# Patient Record
Sex: FEMALE | Race: WHITE | NOT HISPANIC OR LATINO | Employment: OTHER | URBAN - METROPOLITAN AREA
[De-identification: names, ages, dates, MRNs, and addresses within clinical notes are randomized per-mention and may not be internally consistent; named-entity substitution may affect disease eponyms.]

---

## 2023-06-08 ENCOUNTER — PREP FOR SURGERY (OUTPATIENT)
Dept: INTERNAL MEDICINE | Facility: HOSPITAL | Age: 63
End: 2023-06-08
Payer: MEDICAID

## 2023-06-08 ENCOUNTER — HOSPITAL ENCOUNTER (INPATIENT)
Facility: HOSPITAL | Age: 63
LOS: 3 days | Discharge: HOME | End: 2023-06-11
Attending: INTERNAL MEDICINE | Admitting: FAMILY MEDICINE
Payer: MEDICAID

## 2023-06-08 DIAGNOSIS — R26.2 DIFFICULTY IN WALKING: Primary | ICD-10-CM

## 2023-06-08 DIAGNOSIS — I50.22 CHRONIC SYSTOLIC HEART FAILURE: ICD-10-CM

## 2023-06-08 DIAGNOSIS — Z78.9 DECREASED ACTIVITIES OF DAILY LIVING (ADL): ICD-10-CM

## 2023-06-08 PROBLEM — I50.9 ACUTE CHF: Status: ACTIVE | Noted: 2023-06-08

## 2023-06-08 PROBLEM — J18.9 CAP (COMMUNITY ACQUIRED PNEUMONIA): Status: ACTIVE | Noted: 2023-06-08

## 2023-06-08 LAB
ANION GAP SERPL CALCULATED.3IONS-SCNC: 15.9 MMOL/L (ref 5–15)
BACTERIA UR QL AUTO: ABNORMAL /HPF
BILIRUB UR QL STRIP: NEGATIVE
BUN SERPL-MCNC: 16 MG/DL (ref 8–23)
BUN/CREAT SERPL: 16.7 (ref 7–25)
CALCIUM SPEC-SCNC: 9 MG/DL (ref 8.6–10.5)
CHLORIDE SERPL-SCNC: 101 MMOL/L (ref 98–107)
CLARITY UR: CLEAR
CO2 SERPL-SCNC: 23.1 MMOL/L (ref 22–29)
COLOR UR: YELLOW
CREAT SERPL-MCNC: 0.96 MG/DL (ref 0.57–1)
D-LACTATE SERPL-SCNC: 0.9 MMOL/L (ref 0.5–2)
EGFRCR SERPLBLD CKD-EPI 2021: 67.5 ML/MIN/1.73
GEN 5 2HR TROPONIN T REFLEX: 12 NG/L
GLUCOSE BLDC GLUCOMTR-MCNC: 191 MG/DL (ref 70–99)
GLUCOSE BLDC GLUCOMTR-MCNC: 197 MG/DL (ref 70–99)
GLUCOSE SERPL-MCNC: 192 MG/DL (ref 65–99)
GLUCOSE UR STRIP-MCNC: NEGATIVE MG/DL
HBA1C MFR BLD: 6.5 % (ref 4.8–5.6)
HGB UR QL STRIP.AUTO: ABNORMAL
HYALINE CASTS UR QL AUTO: ABNORMAL /LPF
KETONES UR QL STRIP: NEGATIVE
L PNEUMO1 AG UR QL IA: NEGATIVE
LEUKOCYTE ESTERASE UR QL STRIP.AUTO: NEGATIVE
MAGNESIUM SERPL-MCNC: 1.8 MG/DL (ref 1.6–2.4)
MRSA DNA SPEC QL NAA+PROBE: NORMAL
NITRITE UR QL STRIP: NEGATIVE
PH UR STRIP.AUTO: 5.5 [PH] (ref 5–8)
PHOSPHATE SERPL-MCNC: 3.4 MG/DL (ref 2.5–4.5)
POTASSIUM SERPL-SCNC: 4.2 MMOL/L (ref 3.5–5.2)
PROCALCITONIN SERPL-MCNC: 0.24 NG/ML (ref 0–0.25)
PROT UR QL STRIP: ABNORMAL
RBC # UR STRIP: ABNORMAL /HPF
REF LAB TEST METHOD: ABNORMAL
S PNEUM AG SPEC QL LA: NEGATIVE
SODIUM SERPL-SCNC: 140 MMOL/L (ref 136–145)
SP GR UR STRIP: 1.01 (ref 1–1.03)
SQUAMOUS #/AREA URNS HPF: ABNORMAL /HPF
TROPONIN T DELTA: -1 NG/L
TROPONIN T SERPL HS-MCNC: 13 NG/L
TSH SERPL DL<=0.05 MIU/L-ACNC: 0.91 UIU/ML (ref 0.27–4.2)
UROBILINOGEN UR QL STRIP: ABNORMAL
WBC # UR STRIP: ABNORMAL /HPF

## 2023-06-08 PROCEDURE — 63710000001 INSULIN LISPRO (HUMAN) PER 5 UNITS: Performed by: INTERNAL MEDICINE

## 2023-06-08 PROCEDURE — 93010 ELECTROCARDIOGRAM REPORT: CPT | Performed by: INTERNAL MEDICINE

## 2023-06-08 PROCEDURE — 87641 MR-STAPH DNA AMP PROBE: CPT | Performed by: INTERNAL MEDICINE

## 2023-06-08 PROCEDURE — 84100 ASSAY OF PHOSPHORUS: CPT | Performed by: INTERNAL MEDICINE

## 2023-06-08 PROCEDURE — 82948 REAGENT STRIP/BLOOD GLUCOSE: CPT

## 2023-06-08 PROCEDURE — 84484 ASSAY OF TROPONIN QUANT: CPT | Performed by: INTERNAL MEDICINE

## 2023-06-08 PROCEDURE — 99222 1ST HOSP IP/OBS MODERATE 55: CPT | Performed by: INTERNAL MEDICINE

## 2023-06-08 PROCEDURE — 87070 CULTURE OTHR SPECIMN AEROBIC: CPT | Performed by: INTERNAL MEDICINE

## 2023-06-08 PROCEDURE — 87205 SMEAR GRAM STAIN: CPT | Performed by: INTERNAL MEDICINE

## 2023-06-08 PROCEDURE — 25010000002 FUROSEMIDE PER 20 MG: Performed by: INTERNAL MEDICINE

## 2023-06-08 PROCEDURE — 93005 ELECTROCARDIOGRAM TRACING: CPT | Performed by: INTERNAL MEDICINE

## 2023-06-08 PROCEDURE — 87040 BLOOD CULTURE FOR BACTERIA: CPT | Performed by: INTERNAL MEDICINE

## 2023-06-08 PROCEDURE — 83605 ASSAY OF LACTIC ACID: CPT | Performed by: INTERNAL MEDICINE

## 2023-06-08 PROCEDURE — 87449 NOS EACH ORGANISM AG IA: CPT | Performed by: INTERNAL MEDICINE

## 2023-06-08 PROCEDURE — 25010000002 ENOXAPARIN PER 10 MG: Performed by: INTERNAL MEDICINE

## 2023-06-08 PROCEDURE — 83036 HEMOGLOBIN GLYCOSYLATED A1C: CPT | Performed by: INTERNAL MEDICINE

## 2023-06-08 PROCEDURE — 94799 UNLISTED PULMONARY SVC/PX: CPT

## 2023-06-08 PROCEDURE — 80048 BASIC METABOLIC PNL TOTAL CA: CPT

## 2023-06-08 PROCEDURE — 81001 URINALYSIS AUTO W/SCOPE: CPT | Performed by: INTERNAL MEDICINE

## 2023-06-08 PROCEDURE — 84443 ASSAY THYROID STIM HORMONE: CPT | Performed by: INTERNAL MEDICINE

## 2023-06-08 PROCEDURE — 83735 ASSAY OF MAGNESIUM: CPT | Performed by: INTERNAL MEDICINE

## 2023-06-08 PROCEDURE — 87899 AGENT NOS ASSAY W/OPTIC: CPT | Performed by: INTERNAL MEDICINE

## 2023-06-08 PROCEDURE — 94640 AIRWAY INHALATION TREATMENT: CPT

## 2023-06-08 PROCEDURE — 84145 PROCALCITONIN (PCT): CPT | Performed by: INTERNAL MEDICINE

## 2023-06-08 RX ORDER — ONDANSETRON 2 MG/ML
4 INJECTION INTRAMUSCULAR; INTRAVENOUS EVERY 6 HOURS PRN
Status: DISCONTINUED | OUTPATIENT
Start: 2023-06-08 | End: 2023-06-11 | Stop reason: HOSPADM

## 2023-06-08 RX ORDER — LISINOPRIL 5 MG/1
5 TABLET ORAL DAILY
COMMUNITY

## 2023-06-08 RX ORDER — ENOXAPARIN SODIUM 100 MG/ML
40 INJECTION SUBCUTANEOUS DAILY
Status: DISCONTINUED | OUTPATIENT
Start: 2023-06-08 | End: 2023-06-11 | Stop reason: HOSPADM

## 2023-06-08 RX ORDER — ARFORMOTEROL TARTRATE 15 UG/2ML
SOLUTION RESPIRATORY (INHALATION)
Status: DISPENSED
Start: 2023-06-08 | End: 2023-06-09

## 2023-06-08 RX ORDER — SODIUM CHLORIDE 0.9 % (FLUSH) 0.9 %
10 SYRINGE (ML) INJECTION EVERY 12 HOURS SCHEDULED
Status: DISCONTINUED | OUTPATIENT
Start: 2023-06-08 | End: 2023-06-11 | Stop reason: HOSPADM

## 2023-06-08 RX ORDER — INSULIN LISPRO 100 [IU]/ML
2-7 INJECTION, SOLUTION INTRAVENOUS; SUBCUTANEOUS
Status: DISCONTINUED | OUTPATIENT
Start: 2023-06-08 | End: 2023-06-11 | Stop reason: HOSPADM

## 2023-06-08 RX ORDER — BUDESONIDE 0.5 MG/2ML
INHALANT ORAL
Status: DISPENSED
Start: 2023-06-08 | End: 2023-06-09

## 2023-06-08 RX ORDER — BISACODYL 10 MG
10 SUPPOSITORY, RECTAL RECTAL DAILY PRN
Status: DISCONTINUED | OUTPATIENT
Start: 2023-06-08 | End: 2023-06-11 | Stop reason: HOSPADM

## 2023-06-08 RX ORDER — PAROXETINE HYDROCHLORIDE 20 MG/1
40 TABLET, FILM COATED ORAL EVERY MORNING
Status: ON HOLD | COMMUNITY
End: 2023-06-08

## 2023-06-08 RX ORDER — ACETAMINOPHEN 160 MG/5ML
650 SOLUTION ORAL EVERY 4 HOURS PRN
Status: DISCONTINUED | OUTPATIENT
Start: 2023-06-08 | End: 2023-06-11 | Stop reason: HOSPADM

## 2023-06-08 RX ORDER — ARFORMOTEROL TARTRATE 15 UG/2ML
15 SOLUTION RESPIRATORY (INHALATION)
Status: DISCONTINUED | OUTPATIENT
Start: 2023-06-08 | End: 2023-06-11 | Stop reason: HOSPADM

## 2023-06-08 RX ORDER — PAROXETINE HYDROCHLORIDE 40 MG/1
1 TABLET, FILM COATED ORAL DAILY
COMMUNITY
Start: 2023-03-21

## 2023-06-08 RX ORDER — AMOXICILLIN 250 MG
2 CAPSULE ORAL 2 TIMES DAILY
Status: DISCONTINUED | OUTPATIENT
Start: 2023-06-08 | End: 2023-06-11 | Stop reason: HOSPADM

## 2023-06-08 RX ORDER — PAROXETINE HYDROCHLORIDE 20 MG/1
40 TABLET, FILM COATED ORAL EVERY MORNING
Status: DISCONTINUED | OUTPATIENT
Start: 2023-06-09 | End: 2023-06-11 | Stop reason: HOSPADM

## 2023-06-08 RX ORDER — MELOXICAM 15 MG/1
15 TABLET ORAL DAILY
COMMUNITY

## 2023-06-08 RX ORDER — NITROGLYCERIN 0.1MG/HR
1 PATCH, TRANSDERMAL 24 HOURS TRANSDERMAL DAILY
Status: DISCONTINUED | OUTPATIENT
Start: 2023-06-08 | End: 2023-06-11 | Stop reason: HOSPADM

## 2023-06-08 RX ORDER — LISINOPRIL 5 MG/1
5 TABLET ORAL DAILY
Status: DISCONTINUED | OUTPATIENT
Start: 2023-06-08 | End: 2023-06-11 | Stop reason: HOSPADM

## 2023-06-08 RX ORDER — SIMVASTATIN 40 MG
40 TABLET ORAL NIGHTLY
COMMUNITY

## 2023-06-08 RX ORDER — ACETAMINOPHEN 325 MG/1
650 TABLET ORAL EVERY 4 HOURS PRN
Status: DISCONTINUED | OUTPATIENT
Start: 2023-06-08 | End: 2023-06-11 | Stop reason: HOSPADM

## 2023-06-08 RX ORDER — CHOLECALCIFEROL (VITAMIN D3) 125 MCG
5 CAPSULE ORAL NIGHTLY PRN
Status: DISCONTINUED | OUTPATIENT
Start: 2023-06-08 | End: 2023-06-11 | Stop reason: HOSPADM

## 2023-06-08 RX ORDER — SODIUM CHLORIDE 0.9 % (FLUSH) 0.9 %
10 SYRINGE (ML) INJECTION AS NEEDED
Status: DISCONTINUED | OUTPATIENT
Start: 2023-06-08 | End: 2023-06-11 | Stop reason: HOSPADM

## 2023-06-08 RX ORDER — CEFTRIAXONE SODIUM 1 G/50ML
1 INJECTION, SOLUTION INTRAVENOUS EVERY 24 HOURS
Status: DISCONTINUED | OUTPATIENT
Start: 2023-06-09 | End: 2023-06-09

## 2023-06-08 RX ORDER — NITROGLYCERIN 0.4 MG/1
0.4 TABLET SUBLINGUAL
Status: DISCONTINUED | OUTPATIENT
Start: 2023-06-08 | End: 2023-06-11 | Stop reason: HOSPADM

## 2023-06-08 RX ORDER — BUDESONIDE 0.5 MG/2ML
0.5 INHALANT ORAL
Status: DISCONTINUED | OUTPATIENT
Start: 2023-06-08 | End: 2023-06-11 | Stop reason: HOSPADM

## 2023-06-08 RX ORDER — IPRATROPIUM BROMIDE AND ALBUTEROL SULFATE 2.5; .5 MG/3ML; MG/3ML
3 SOLUTION RESPIRATORY (INHALATION) EVERY 4 HOURS PRN
Status: DISCONTINUED | OUTPATIENT
Start: 2023-06-08 | End: 2023-06-11 | Stop reason: HOSPADM

## 2023-06-08 RX ORDER — BISACODYL 5 MG/1
5 TABLET, DELAYED RELEASE ORAL DAILY PRN
Status: DISCONTINUED | OUTPATIENT
Start: 2023-06-08 | End: 2023-06-11 | Stop reason: HOSPADM

## 2023-06-08 RX ORDER — DEXTROSE MONOHYDRATE 25 G/50ML
25 INJECTION, SOLUTION INTRAVENOUS
Status: DISCONTINUED | OUTPATIENT
Start: 2023-06-08 | End: 2023-06-11 | Stop reason: HOSPADM

## 2023-06-08 RX ORDER — CARVEDILOL 12.5 MG/1
12.5 TABLET ORAL 2 TIMES DAILY WITH MEALS
Status: DISCONTINUED | OUTPATIENT
Start: 2023-06-08 | End: 2023-06-11 | Stop reason: HOSPADM

## 2023-06-08 RX ORDER — AZITHROMYCIN 250 MG/1
500 TABLET, FILM COATED ORAL DAILY
Status: COMPLETED | OUTPATIENT
Start: 2023-06-08 | End: 2023-06-08

## 2023-06-08 RX ORDER — TRAMADOL HYDROCHLORIDE 50 MG/1
50 TABLET ORAL EVERY 6 HOURS PRN
Status: DISCONTINUED | OUTPATIENT
Start: 2023-06-08 | End: 2023-06-11 | Stop reason: HOSPADM

## 2023-06-08 RX ORDER — METFORMIN HYDROCHLORIDE 500 MG/1
500 TABLET, EXTENDED RELEASE ORAL
COMMUNITY

## 2023-06-08 RX ORDER — ACETAMINOPHEN 650 MG/1
650 SUPPOSITORY RECTAL EVERY 4 HOURS PRN
Status: DISCONTINUED | OUTPATIENT
Start: 2023-06-08 | End: 2023-06-11 | Stop reason: HOSPADM

## 2023-06-08 RX ORDER — FUROSEMIDE 10 MG/ML
40 INJECTION INTRAMUSCULAR; INTRAVENOUS 2 TIMES DAILY
Status: DISCONTINUED | OUTPATIENT
Start: 2023-06-08 | End: 2023-06-10

## 2023-06-08 RX ORDER — ASPIRIN 81 MG/1
81 TABLET ORAL DAILY
Status: DISCONTINUED | OUTPATIENT
Start: 2023-06-08 | End: 2023-06-11 | Stop reason: HOSPADM

## 2023-06-08 RX ORDER — ATORVASTATIN CALCIUM 20 MG/1
20 TABLET, FILM COATED ORAL DAILY
Status: DISCONTINUED | OUTPATIENT
Start: 2023-06-08 | End: 2023-06-11 | Stop reason: HOSPADM

## 2023-06-08 RX ORDER — AZITHROMYCIN 250 MG/1
250 TABLET, FILM COATED ORAL DAILY
Status: DISCONTINUED | OUTPATIENT
Start: 2023-06-09 | End: 2023-06-11 | Stop reason: HOSPADM

## 2023-06-08 RX ORDER — ALUMINA, MAGNESIA, AND SIMETHICONE 2400; 2400; 240 MG/30ML; MG/30ML; MG/30ML
15 SUSPENSION ORAL EVERY 6 HOURS PRN
Status: DISCONTINUED | OUTPATIENT
Start: 2023-06-08 | End: 2023-06-11 | Stop reason: HOSPADM

## 2023-06-08 RX ORDER — POLYETHYLENE GLYCOL 3350 17 G/17G
17 POWDER, FOR SOLUTION ORAL DAILY PRN
Status: DISCONTINUED | OUTPATIENT
Start: 2023-06-08 | End: 2023-06-11 | Stop reason: HOSPADM

## 2023-06-08 RX ORDER — KETOROLAC TROMETHAMINE 15 MG/ML
30 INJECTION, SOLUTION INTRAMUSCULAR; INTRAVENOUS EVERY 6 HOURS PRN
Status: DISCONTINUED | OUTPATIENT
Start: 2023-06-08 | End: 2023-06-11 | Stop reason: HOSPADM

## 2023-06-08 RX ORDER — ASPIRIN 325 MG
325 TABLET, DELAYED RELEASE (ENTERIC COATED) ORAL DAILY
COMMUNITY
End: 2023-06-11 | Stop reason: HOSPADM

## 2023-06-08 RX ORDER — NICOTINE POLACRILEX 4 MG
15 LOZENGE BUCCAL
Status: DISCONTINUED | OUTPATIENT
Start: 2023-06-08 | End: 2023-06-11 | Stop reason: HOSPADM

## 2023-06-08 RX ORDER — SODIUM CHLORIDE 9 MG/ML
40 INJECTION, SOLUTION INTRAVENOUS AS NEEDED
Status: DISCONTINUED | OUTPATIENT
Start: 2023-06-08 | End: 2023-06-11 | Stop reason: HOSPADM

## 2023-06-08 RX ORDER — CARVEDILOL 12.5 MG/1
12.5 TABLET ORAL 2 TIMES DAILY WITH MEALS
COMMUNITY

## 2023-06-08 RX ORDER — NALOXONE HCL 0.4 MG/ML
0.4 VIAL (ML) INJECTION
Status: DISCONTINUED | OUTPATIENT
Start: 2023-06-08 | End: 2023-06-11 | Stop reason: HOSPADM

## 2023-06-08 RX ORDER — CALCIUM CARBONATE 500 MG/1
2 TABLET, CHEWABLE ORAL 2 TIMES DAILY PRN
Status: DISCONTINUED | OUTPATIENT
Start: 2023-06-08 | End: 2023-06-11 | Stop reason: HOSPADM

## 2023-06-08 RX ADMIN — INSULIN LISPRO 2 UNITS: 100 INJECTION, SOLUTION INTRAVENOUS; SUBCUTANEOUS at 17:03

## 2023-06-08 RX ADMIN — ENOXAPARIN SODIUM 40 MG: 100 INJECTION SUBCUTANEOUS at 21:45

## 2023-06-08 RX ADMIN — ASPIRIN 81 MG: 81 TABLET, COATED ORAL at 16:56

## 2023-06-08 RX ADMIN — LISINOPRIL 5 MG: 5 TABLET ORAL at 21:45

## 2023-06-08 RX ADMIN — ATORVASTATIN CALCIUM 20 MG: 20 TABLET, FILM COATED ORAL at 21:45

## 2023-06-08 RX ADMIN — Medication 10 ML: at 20:43

## 2023-06-08 RX ADMIN — NITROGLYCERIN 1 PATCH: 0.1 PATCH TRANSDERMAL at 16:56

## 2023-06-08 RX ADMIN — BUDESONIDE 0.5 MG: 0.5 INHALANT RESPIRATORY (INHALATION) at 20:31

## 2023-06-08 RX ADMIN — INSULIN LISPRO 2 UNITS: 100 INJECTION, SOLUTION INTRAVENOUS; SUBCUTANEOUS at 21:45

## 2023-06-08 RX ADMIN — ARFORMOTEROL TARTRATE 15 MCG: 15 SOLUTION RESPIRATORY (INHALATION) at 20:31

## 2023-06-08 RX ADMIN — CARVEDILOL 12.5 MG: 12.5 TABLET, FILM COATED ORAL at 17:03

## 2023-06-08 RX ADMIN — AZITHROMYCIN MONOHYDRATE 500 MG: 250 TABLET ORAL at 16:56

## 2023-06-08 RX ADMIN — FUROSEMIDE 40 MG: 10 INJECTION, SOLUTION INTRAMUSCULAR; INTRAVENOUS at 20:43

## 2023-06-08 NOTE — H&P
[facility]   HOSPITALIST HISTORY AND PHYSICAL  Date: 2023   Patient Name: Cristina Barroso  : 1960  MRN: [id number]  Primary Care Physician:  Lyndsey Stephens APRN  Date of admission: 2023    Subjective   Subjective     Chief Complaint: Shortness of air got worse last night along with chest tightness    HPI:    Cristina Barroso is a 62 y.o. female with past medical history of diabetes mellitus, coronary disease s/p CABG, CHF, hyperlipidemia and hypertension.  Patient has been having dizziness vision for sometimes.  Yesterday got worse with minimal exertion or even at rest.  She also has been experiencing occasional PND.  She has chronic orthopnea.  Denies any leg swelling or weight gain.  Last night patient had chest tightness 10/10 it felt like somebody squeezing her lungs and mid chest area going up into her neck and jaw.  Lasted for few hours and relieved by using her 's oxygen.  No chest discomfort right now.  Patient has been having cough productive of green mucus for several days.  No fever but chills.  Patient has been wheezing.  Patient had nausea but no vomiting diarrhea abdominal complaints or urinary problems.  Patient has been feeling tired with minimal exertion.  For the symptoms she was seen at [facility].  Work-up showed hypoxemia with 88% saturation on room air with 3 L 95%.  Troponin is up 309 with normal being 51.  proBNP is elevated at 1978 normal being 125.  White cell count is 11,500.  EKG no acute finding.  CT chest showed right pleural effusion with bilateral groundglass opacities indicative of pulm edema.  She used to see Dr. Gan in the past for heart issues until insurance issues.  Her last stress test was many years ago.  Outside ED physician consulted cardiologist at [facility] who agreed to see patient in consultation.  Hospitalist has been called to admit her for further evaluation.      Personal History     Past Medical History:  Past  Medical History:   Diagnosis Date    CHF (congestive heart failure)     Coronary artery disease     Diabetes mellitus     Hyperlipidemia     Hypertension        Past Surgical History:  Past Surgical History:   Procedure Laterality Date    CARDIAC CATHETERIZATION      CARDIAC DEFIBRILLATOR PLACEMENT Left     FRACTURE SURGERY      HYSTERECTOMY      VASCULAR SURGERY         Family History:   family history is not on file.  Not significant    Social History:    reports that she quit smoking about 5 months ago. Her smoking use included cigarettes. She started smoking about 48 years ago. She smoked an average of .25 packs per day. She has been exposed to tobacco smoke. She has never used smokeless tobacco. She reports that she does not drink alcohol and does not use drugs.    Home Medications:  PARoxetine, aspirin, carvedilol, lisinopril, meloxicam, metFORMIN ER, and simvastatin    Allergies:  Allergies   Allergen Reactions    Demerol [Meperidine] Hallucinations    Oxycodone Hallucinations       Review of Systems   All systems were reviewed and negative except for: H&P    Objective   Objective     Vitals:        Physical Exam    Constitutional: Awake, alert, no acute distress   Eyes: Pupils equal, sclerae anicteric, no conjunctival injection   HENT: NCAT, mucous membranes moist   Neck: Supple, no thyromegaly, no lymphadenopathy, trachea midline   Respiratory: Occasional wheezing in the front.  Bibasilar Rales , nonlabored respirations    Cardiovascular: RRR, no murmurs, rubs, or gallops, palpable pedal pulses bilaterally   Gastrointestinal: Positive bowel sounds, soft, nontender, nondistended   Musculoskeletal: No bilateral ankle edema, no clubbing or cyanosis to extremities   Psychiatric: Appropriate affect, cooperative   Neurologic: Oriented x 3, strength symmetric in all extremities, Cranial Nerves grossly intact to confrontation, speech clear   Skin: No rashes     Result Review    Result Review:  I have personally  reviewed the results from the time of this admission to 6/8/2023 14:52 EDT and agree with these findings:  [x]  Laboratory  []  Microbiology  []  Radiology  []  EKG/Telemetry   []  Cardiology/Vascular   []  Pathology  [x]  Old records  [x]  Other: Medications      Assessment & Plan   Assessment / Plan     Assessment:  Acute on chronic CHF.  EF not known.  Bibasilar community-acquired pneumonia.  Hypertension.  Hyperlipidemia.  Diabetes mellitus.  Elevated troponin rule out ACS..      Plan:   Admit to monitored bed.  Low-salt diet, fluid restriction, strict input output and daily weights.  IV Lasix.  Nitropaste.  Continue home Coreg and lisinopril.  Continue home aspirin and statin.  Check 2D echo.  Check lipid profile.  Discussed with cardiology to evaluate patient for chest pain and CHF.  Trend troponin.  IV Rocephin and p.o. Zithromax.  Sputum culture.  Nebulizer treatment.  Bronchodilator and bronchopulmonary hygiene protocol  Hold off steroids  Check procalcitonin.  MRSA PCR, Legionella and strep pneumonia urine antigen.  Sliding-scale insulin.  Discussed with the ED physician and nursing staff         DVT prophylaxis:  Medical DVT prophylaxis orders are present.  Lovenox    CODE STATUS:    Code Status (Patient has no pulse and is not breathing): CPR (Attempt to Resuscitate)  Medical Interventions (Patient has pulse or is breathing): Full Support      Admission Status:  I believe this patient meets inpatient status.    Part of this note may be an electronic transcription/translation of spoken language to printed text using the Dragon Dictation System.     Electronically signed by Tunde Light MD, 06/08/23, 2:52 PM EDT.

## 2023-06-08 NOTE — PLAN OF CARE
Goal Outcome Evaluation:      New admission on unit, oriented to call system and room.  Lab specimens collected and sent to the lab.  Patient continues on 3L oxygen via NC.  Patient resting comfortably in bed

## 2023-06-08 NOTE — CONSULTS
Cardiology Consult Note  [facility] CARE UNIT          Patient Identification:  Cristina Barroso      [id number]  62 y.o.        female  1960       Date of Consultation:   2023    Reason for Consultation:   shortness of breath    PCP: Lyndsey Stephens APRN  Primary cardiologist:  none recently, previously saw Dr. Grant more than  A few years ago    History of Present Illness:      62-year-old white female.  She has coronary artery disease with remote CABG.  She has a permanent pacemaker which has not been checked in several years.   she went to her local emergency room with shortness of breath over the past 2 days, with tightness in her chest.  No chest pain.  Symptoms occurred at rest.  She thought she was retaining fluid.  She has not had a recent cardiac evaluation.    Past History:  Past Medical History:   Diagnosis Date    CHF (congestive heart failure)     Coronary artery disease     Diabetes mellitus     Hyperlipidemia     Hypertension      Past Surgical History:   Procedure Laterality Date    CARDIAC CATHETERIZATION      CARDIAC DEFIBRILLATOR PLACEMENT Left     FRACTURE SURGERY      HYSTERECTOMY      VASCULAR SURGERY       Allergies   Allergen Reactions    Demerol [Meperidine] Hallucinations    Oxycodone Hallucinations     Social History     Socioeconomic History    Marital status:    Tobacco Use    Smoking status: Former     Packs/day: 0.25     Types: Cigarettes     Start date: 1975     Quit date: 2023     Years since quittin.4     Passive exposure: Past    Smokeless tobacco: Never   Vaping Use    Vaping Use: Never used   Substance and Sexual Activity    Alcohol use: Never    Drug use: Never    Sexual activity: Defer     History reviewed. No pertinent family history.  Medications:  Medications Prior to Admission   Medication Sig Dispense Refill Last Dose    aspirin 81 MG EC tablet Take 1 tablet by mouth Daily.   Unknown    carvedilol (COREG) 12.5 MG  tablet Take 1 tablet by mouth 2 (Two) Times a Day With Meals.   Unknown    lisinopril (PRINIVIL,ZESTRIL) 5 MG tablet Take 1 tablet by mouth Daily.   Unknown    meloxicam (MOBIC) 15 MG tablet Take 1 tablet by mouth Daily.   Unknown    metFORMIN ER (GLUCOPHAGE-XR) 500 MG 24 hr tablet Take 1 tablet by mouth Daily With Breakfast.   Unknown    PARoxetine (PAXIL) 20 MG tablet Take 2 tablets by mouth Every Morning.   Unknown    simvastatin (ZOCOR) 40 MG tablet Take 1 tablet by mouth Every Night.   Unknown     Current medications:  arformoterol, 15 mcg, Nebulization, BID - RT  aspirin, 81 mg, Oral, Daily  atorvastatin, 20 mg, Oral, Daily  azithromycin, 500 mg, Oral, Daily   Followed by  [START ON 6/9/2023] azithromycin, 250 mg, Oral, Daily  budesonide, 0.5 mg, Nebulization, BID - RT  carvedilol, 12.5 mg, Oral, BID With Meals  [START ON 6/9/2023] cefTRIAXone, 1 g, Intravenous, Q24H  enoxaparin, 40 mg, Subcutaneous, Daily  furosemide, 40 mg, Intravenous, BID  insulin lispro, 2-7 Units, Subcutaneous, 4x Daily AC & at Bedtime  lisinopril, 5 mg, Oral, Daily  nitroglycerin, 1 patch, Transdermal, Daily  [START ON 6/9/2023] PARoxetine, 40 mg, Oral, QAM  senna-docusate sodium, 2 tablet, Oral, BID  sodium chloride, 10 mL, Intravenous, Q12H      Current IV drips:       Review of Systems   Constitutional: Positive for malaise/fatigue.   HENT: Negative.     Eyes: Negative.    Cardiovascular:  Positive for dyspnea on exertion.          Chest tightness   Respiratory:  Positive for shortness of breath.    Endocrine: Negative.    Hematologic/Lymphatic: Negative.    Skin: Negative.    Musculoskeletal: Negative.    Gastrointestinal: Negative.    Genitourinary: Negative.    Neurological: Negative.    Psychiatric/Behavioral: Negative.         Physical exam:    There were no vitals taken for this visit. There is no height or weight on file to calculate BMI.    No data recorded    General Appearance:   well developed  well nourished  HENT:    oropharynx moist  lips not cyanotic  Neck:  thyroid not enlarged  supple  Respiratory:  no respiratory distress  normal breath sounds  no rales  Cardiovascular:  no jugular venous distention  regular rhythm  apical impulse normal  S1 normal, S2 normal  no S3, no S4   no murmur  no rub, no thrill  carotid pulses normal; no bruit  pedal pulses normal  lower extremity edema: none    Gastrointestinal:   bowel sounds normal  non-tender  no hepatomegaly, no splenomegaly  Musculoskeletal:  no clubbing of fingers.   normocephalic, head atraumatic  Skin:   warm, dry  Neuro/Psychiatric:  judgement and insight appropriate  normal mood and affect    Cardiographics:     ECG  (personally reviewed)  pending   Telemetry:  (personally reviewed)  sinus rhythm, questionable  fusion pacing   Echo pending   CATH:     CARDIOLITE:      Lab Review:           Invalid input(s): PLATELETCT            CrCl cannot be calculated (No successful lab value found.).         Invalid input(s): LDLCALC        No results found for: TSH   No results found for: HGBA1C        No results found for: DIGOXIN   No components found for: DDIMERQUAN     Imaging:       The ASCVD Risk score (Webster DK, et al., 2019) failed to calculate for the following reasons:    The systolic blood pressure is missing    Cannot find a previous HDL lab    Cannot find a previous total cholesterol lab      Assessment:      Acute CHF    CAP (community acquired pneumonia)      Initial cardiac assessment:   62-year-old white female with coronary artery disease and a permanent pacemaker.  She is admitted in transfer from her local emergency room with shortness of breath and chest tightness over the last 2 days.  Work-up there showed slightly elevated troponin and elevated BNP.  Her chest x-ray apparently showed possible pneumonia and she was transferred for further management.  She is comfortable currently.       Recommendations:  1.   Obtain EKG, will review echo when available  2.   hold metformin, continue statin, ACE inhibitor, beta-blocker, aspirin  3.  reevaluate for ischemia either by stress imaging or cardiac catheterization depending on early work-up  4.  we will try to get pacemaker interrogated since it has been over 5 years since anyone has checked this              Phil Marie MD  6/8/2023    15:25 EDT

## 2023-06-08 NOTE — PROGRESS NOTES
Transfer from [facility] ED.  Patient has past medical history of CHF, coronary disease s/p CABG and follows with Dr. Gan.  Patient presented to outside ED due to shortness of air.  Patient has been having wheezing and cough productive of greenish mucus for 1 to 2 weeks.  There is no fever.  Work-up in the ED showed 88% saturation on room air.  With 3 L it is 95%, other vital signs stable.  Troponin is 309 normal being 51.  BNP is 1970.  Normal being 125.  White cell count of 11,500.  CT of the chest showed bilateral groundglass opacities consistent with pulm edema and there is a small right pleural effusion.  No PE reported.  EKG not significant.  Hospitalist at [facility] contacted for transfer after her case was discussed with Dr. Marie covering for Dr. Gan for CHF and bronchitis treatment.    Electronically signed by Tunde Light MD, 06/08/23, 10:31 AM EDT.

## 2023-06-09 ENCOUNTER — APPOINTMENT (OUTPATIENT)
Dept: NUCLEAR MEDICINE | Facility: HOSPITAL | Age: 63
End: 2023-06-09
Payer: MEDICAID

## 2023-06-09 ENCOUNTER — APPOINTMENT (OUTPATIENT)
Dept: CARDIOLOGY | Facility: HOSPITAL | Age: 63
End: 2023-06-09
Payer: MEDICAID

## 2023-06-09 LAB
ANION GAP SERPL CALCULATED.3IONS-SCNC: 9.7 MMOL/L (ref 5–15)
BASOPHILS # BLD AUTO: 0.01 10*3/MM3 (ref 0–0.2)
BASOPHILS NFR BLD AUTO: 0.1 % (ref 0–1.5)
BH CV ECHO MEAS - AO MAX PG: 8 MMHG
BH CV ECHO MEAS - AO MEAN PG: 4.2 MMHG
BH CV ECHO MEAS - AO ROOT DIAM: 3 CM
BH CV ECHO MEAS - AO V2 MAX: 139 CM/SEC
BH CV ECHO MEAS - AO V2 VTI: 29.2 CM
BH CV ECHO MEAS - AVA(I,D): 2.16 CM2
BH CV ECHO MEAS - EDV(CUBED): 176.9 ML
BH CV ECHO MEAS - EDV(MOD-SP2): 131 ML
BH CV ECHO MEAS - EDV(MOD-SP4): 183 ML
BH CV ECHO MEAS - EF(MOD-BP): 24.7 %
BH CV ECHO MEAS - EF(MOD-SP2): 23.7 %
BH CV ECHO MEAS - EF(MOD-SP4): 25.7 %
BH CV ECHO MEAS - ESV(CUBED): 125.4 ML
BH CV ECHO MEAS - ESV(MOD-SP2): 100 ML
BH CV ECHO MEAS - ESV(MOD-SP4): 136 ML
BH CV ECHO MEAS - FS: 10.8 %
BH CV ECHO MEAS - IVS/LVPW: 1.26 CM
BH CV ECHO MEAS - IVSD: 1.29 CM
BH CV ECHO MEAS - LA DIMENSION: 5 CM
BH CV ECHO MEAS - LAT PEAK E' VEL: 5.2 CM/SEC
BH CV ECHO MEAS - LV DIASTOLIC VOL/BSA (35-75): 97.8 CM2
BH CV ECHO MEAS - LV MASS(C)D: 267.6 GRAMS
BH CV ECHO MEAS - LV MAX PG: 3.3 MMHG
BH CV ECHO MEAS - LV MEAN PG: 1.93 MMHG
BH CV ECHO MEAS - LV SYSTOLIC VOL/BSA (12-30): 72.7 CM2
BH CV ECHO MEAS - LV V1 MAX: 90.6 CM/SEC
BH CV ECHO MEAS - LV V1 VTI: 20.9 CM
BH CV ECHO MEAS - LVIDD: 5.6 CM
BH CV ECHO MEAS - LVIDS: 5 CM
BH CV ECHO MEAS - LVOT AREA: 3 CM2
BH CV ECHO MEAS - LVOT DIAM: 1.96 CM
BH CV ECHO MEAS - LVPWD: 1.02 CM
BH CV ECHO MEAS - MED PEAK E' VEL: 2.9 CM/SEC
BH CV ECHO MEAS - MR MAX PG: 92.6 MMHG
BH CV ECHO MEAS - MR MAX VEL: 481.1 CM/SEC
BH CV ECHO MEAS - MR MEAN PG: 58.5 MMHG
BH CV ECHO MEAS - MR MEAN VEL: 360.4 CM/SEC
BH CV ECHO MEAS - MR VTI: 170.4 CM
BH CV ECHO MEAS - MV A MAX VEL: 71 CM/SEC
BH CV ECHO MEAS - MV DEC SLOPE: 551 CM/SEC2
BH CV ECHO MEAS - MV DEC TIME: 0.25 MSEC
BH CV ECHO MEAS - MV E MAX VEL: 127 CM/SEC
BH CV ECHO MEAS - MV E/A: 1.79
BH CV ECHO MEAS - MV MAX PG: 8 MMHG
BH CV ECHO MEAS - MV MEAN PG: 2.7 MMHG
BH CV ECHO MEAS - MV P1/2T: 78.5 MSEC
BH CV ECHO MEAS - MV V2 VTI: 41.7 CM
BH CV ECHO MEAS - MVA(P1/2T): 2.8 CM2
BH CV ECHO MEAS - MVA(VTI): 1.51 CM2
BH CV ECHO MEAS - RAP SYSTOLE: 3 MMHG
BH CV ECHO MEAS - RVDD: 3.4 CM
BH CV ECHO MEAS - RVSP: 27.3 MMHG
BH CV ECHO MEAS - SI(MOD-SP2): 16.6 ML/M2
BH CV ECHO MEAS - SI(MOD-SP4): 25.1 ML/M2
BH CV ECHO MEAS - SV(LVOT): 63.1 ML
BH CV ECHO MEAS - SV(MOD-SP2): 31 ML
BH CV ECHO MEAS - SV(MOD-SP4): 47 ML
BH CV ECHO MEAS - TR MAX PG: 24.3 MMHG
BH CV ECHO MEAS - TR MAX VEL: 246.5 CM/SEC
BH CV ECHO MEASUREMENTS AVERAGE E/E' RATIO: 31.36
BH CV IMMEDIATE POST TECH DATA BLOOD PRESSURE: NORMAL MMHG
BH CV IMMEDIATE POST TECH DATA HEART RATE: 99 BPM
BH CV IMMEDIATE POST TECH DATA OXYGEN SATS: 98 %
BH CV REST NUCLEAR ISOTOPE DOSE: 10.2 MCI
BH CV SIX MINUTE RECOVERY TECH DATA BLOOD PRESSURE: NORMAL
BH CV SIX MINUTE RECOVERY TECH DATA HEART RATE: 86 BPM
BH CV SIX MINUTE RECOVERY TECH DATA OXYGEN SATURATION: 98 %
BH CV STRESS BP STAGE 1: NORMAL
BH CV STRESS COMMENTS STAGE 1: NORMAL
BH CV STRESS DOSE REGADENOSON STAGE 1: 0.4
BH CV STRESS DURATION MIN STAGE 1: 0
BH CV STRESS DURATION SEC STAGE 1: 10
BH CV STRESS HR STAGE 1: 75
BH CV STRESS NUCLEAR ISOTOPE DOSE: 36.2 MCI
BH CV STRESS O2 STAGE 1: 98
BH CV STRESS PROTOCOL 1: NORMAL
BH CV STRESS RECOVERY BP: NORMAL MMHG
BH CV STRESS RECOVERY HR: 86 BPM
BH CV STRESS RECOVERY O2: 98 %
BH CV STRESS STAGE 1: 1
BH CV THREE MINUTE POST TECH DATA BLOOD PRESSURE: NORMAL MMHG
BH CV THREE MINUTE POST TECH DATA HEART RATE: 91 BPM
BH CV THREE MINUTE POST TECH DATA OXYGEN SATURATION: 98 %
BUN SERPL-MCNC: 23 MG/DL (ref 8–23)
BUN/CREAT SERPL: 20.7 (ref 7–25)
CALCIUM SPEC-SCNC: 9 MG/DL (ref 8.6–10.5)
CHLORIDE SERPL-SCNC: 100 MMOL/L (ref 98–107)
CHOLEST SERPL-MCNC: 151 MG/DL (ref 0–200)
CK SERPL-CCNC: 52 U/L (ref 20–180)
CO2 SERPL-SCNC: 27.3 MMOL/L (ref 22–29)
CREAT SERPL-MCNC: 1.11 MG/DL (ref 0.57–1)
DEPRECATED RDW RBC AUTO: 53 FL (ref 37–54)
EGFRCR SERPLBLD CKD-EPI 2021: 56.7 ML/MIN/1.73
EOSINOPHIL # BLD AUTO: 0 10*3/MM3 (ref 0–0.4)
EOSINOPHIL NFR BLD AUTO: 0 % (ref 0.3–6.2)
ERYTHROCYTE [DISTWIDTH] IN BLOOD BY AUTOMATED COUNT: 15.2 % (ref 12.3–15.4)
GLUCOSE BLDC GLUCOMTR-MCNC: 133 MG/DL (ref 70–99)
GLUCOSE BLDC GLUCOMTR-MCNC: 150 MG/DL (ref 70–99)
GLUCOSE BLDC GLUCOMTR-MCNC: 168 MG/DL (ref 70–99)
GLUCOSE SERPL-MCNC: 190 MG/DL (ref 65–99)
HCT VFR BLD AUTO: 40.8 % (ref 34–46.6)
HDLC SERPL-MCNC: 51 MG/DL (ref 40–60)
HGB BLD-MCNC: 13 G/DL (ref 12–15.9)
IMM GRANULOCYTES # BLD AUTO: 0.05 10*3/MM3 (ref 0–0.05)
IMM GRANULOCYTES NFR BLD AUTO: 0.5 % (ref 0–0.5)
LDLC SERPL CALC-MCNC: 80 MG/DL (ref 0–100)
LDLC/HDLC SERPL: 1.52 {RATIO}
LEFT ATRIUM VOLUME INDEX: 45.6 ML/M2
LV EF NUC BP: 22 %
LYMPHOCYTES # BLD AUTO: 0.95 10*3/MM3 (ref 0.7–3.1)
LYMPHOCYTES NFR BLD AUTO: 8.9 % (ref 19.6–45.3)
MAGNESIUM SERPL-MCNC: 1.8 MG/DL (ref 1.6–2.4)
MAXIMAL PREDICTED HEART RATE: 159 BPM
MCH RBC QN AUTO: 30.4 PG (ref 26.6–33)
MCHC RBC AUTO-ENTMCNC: 31.9 G/DL (ref 31.5–35.7)
MCV RBC AUTO: 95.6 FL (ref 79–97)
MONOCYTES # BLD AUTO: 0.7 10*3/MM3 (ref 0.1–0.9)
MONOCYTES NFR BLD AUTO: 6.5 % (ref 5–12)
NEUTROPHILS NFR BLD AUTO: 84 % (ref 42.7–76)
NEUTROPHILS NFR BLD AUTO: 9 10*3/MM3 (ref 1.7–7)
NRBC BLD AUTO-RTO: 0 /100 WBC (ref 0–0.2)
NT-PROBNP SERPL-MCNC: 2739 PG/ML (ref 0–900)
PERCENT MAX PREDICTED HR: 62.26 %
PHOSPHATE SERPL-MCNC: 3.2 MG/DL (ref 2.5–4.5)
PLATELET # BLD AUTO: 190 10*3/MM3 (ref 140–450)
PMV BLD AUTO: 11.6 FL (ref 6–12)
POTASSIUM SERPL-SCNC: 4.1 MMOL/L (ref 3.5–5.2)
QT INTERVAL: 495 MS
RBC # BLD AUTO: 4.27 10*6/MM3 (ref 3.77–5.28)
SODIUM SERPL-SCNC: 137 MMOL/L (ref 136–145)
STRESS BASELINE BP: NORMAL MMHG
STRESS BASELINE HR: 61 BPM
STRESS O2 SAT REST: 98 %
STRESS PERCENT HR: 73 %
STRESS POST O2 SAT PEAK: 98 %
STRESS POST PEAK BP: NORMAL MMHG
STRESS POST PEAK HR: 99 BPM
STRESS TARGET HR: 135 BPM
TRIGL SERPL-MCNC: 113 MG/DL (ref 0–150)
VLDLC SERPL-MCNC: 20 MG/DL (ref 5–40)
WBC NRBC COR # BLD: 10.71 10*3/MM3 (ref 3.4–10.8)

## 2023-06-09 PROCEDURE — 82948 REAGENT STRIP/BLOOD GLUCOSE: CPT

## 2023-06-09 PROCEDURE — 93017 CV STRESS TEST TRACING ONLY: CPT

## 2023-06-09 PROCEDURE — 83735 ASSAY OF MAGNESIUM: CPT | Performed by: INTERNAL MEDICINE

## 2023-06-09 PROCEDURE — 25010000002 ENOXAPARIN PER 10 MG: Performed by: INTERNAL MEDICINE

## 2023-06-09 PROCEDURE — 25010000002 SULFUR HEXAFLUORIDE MICROSPH 60.7-25 MG RECONSTITUTED SUSPENSION: Performed by: INTERNAL MEDICINE

## 2023-06-09 PROCEDURE — 94799 UNLISTED PULMONARY SVC/PX: CPT

## 2023-06-09 PROCEDURE — 99232 SBSQ HOSP IP/OBS MODERATE 35: CPT | Performed by: INTERNAL MEDICINE

## 2023-06-09 PROCEDURE — 97165 OT EVAL LOW COMPLEX 30 MIN: CPT

## 2023-06-09 PROCEDURE — 25010000002 CEFTRIAXONE PER 250 MG: Performed by: INTERNAL MEDICINE

## 2023-06-09 PROCEDURE — 78452 HT MUSCLE IMAGE SPECT MULT: CPT | Performed by: INTERNAL MEDICINE

## 2023-06-09 PROCEDURE — A9502 TC99M TETROFOSMIN: HCPCS | Performed by: INTERNAL MEDICINE

## 2023-06-09 PROCEDURE — 78452 HT MUSCLE IMAGE SPECT MULT: CPT

## 2023-06-09 PROCEDURE — 25010000002 FUROSEMIDE PER 20 MG: Performed by: INTERNAL MEDICINE

## 2023-06-09 PROCEDURE — 97161 PT EVAL LOW COMPLEX 20 MIN: CPT

## 2023-06-09 PROCEDURE — 84100 ASSAY OF PHOSPHORUS: CPT | Performed by: INTERNAL MEDICINE

## 2023-06-09 PROCEDURE — 83880 ASSAY OF NATRIURETIC PEPTIDE: CPT | Performed by: INTERNAL MEDICINE

## 2023-06-09 PROCEDURE — 80061 LIPID PANEL: CPT | Performed by: INTERNAL MEDICINE

## 2023-06-09 PROCEDURE — 85025 COMPLETE CBC W/AUTO DIFF WBC: CPT | Performed by: INTERNAL MEDICINE

## 2023-06-09 PROCEDURE — 93016 CV STRESS TEST SUPVJ ONLY: CPT | Performed by: NURSE PRACTITIONER

## 2023-06-09 PROCEDURE — 80048 BASIC METABOLIC PNL TOTAL CA: CPT | Performed by: INTERNAL MEDICINE

## 2023-06-09 PROCEDURE — 0 TECHNETIUM TETROFOSMIN KIT: Performed by: INTERNAL MEDICINE

## 2023-06-09 PROCEDURE — 93306 TTE W/DOPPLER COMPLETE: CPT

## 2023-06-09 PROCEDURE — 93018 CV STRESS TEST I&R ONLY: CPT | Performed by: INTERNAL MEDICINE

## 2023-06-09 PROCEDURE — 25010000002 REGADENOSON 0.4 MG/5ML SOLUTION: Performed by: INTERNAL MEDICINE

## 2023-06-09 PROCEDURE — 93306 TTE W/DOPPLER COMPLETE: CPT | Performed by: INTERNAL MEDICINE

## 2023-06-09 PROCEDURE — 82550 ASSAY OF CK (CPK): CPT | Performed by: INTERNAL MEDICINE

## 2023-06-09 RX ORDER — REGADENOSON 0.08 MG/ML
0.4 INJECTION, SOLUTION INTRAVENOUS
Status: COMPLETED | OUTPATIENT
Start: 2023-06-09 | End: 2023-06-09

## 2023-06-09 RX ADMIN — ARFORMOTEROL TARTRATE 15 MCG: 15 SOLUTION RESPIRATORY (INHALATION) at 06:40

## 2023-06-09 RX ADMIN — CARVEDILOL 12.5 MG: 12.5 TABLET, FILM COATED ORAL at 14:18

## 2023-06-09 RX ADMIN — FUROSEMIDE 40 MG: 10 INJECTION, SOLUTION INTRAMUSCULAR; INTRAVENOUS at 14:16

## 2023-06-09 RX ADMIN — BUDESONIDE 0.5 MG: 0.5 INHALANT RESPIRATORY (INHALATION) at 19:08

## 2023-06-09 RX ADMIN — AZITHROMYCIN MONOHYDRATE 250 MG: 250 TABLET ORAL at 14:18

## 2023-06-09 RX ADMIN — CARVEDILOL 12.5 MG: 12.5 TABLET, FILM COATED ORAL at 17:41

## 2023-06-09 RX ADMIN — REGADENOSON 0.4 MG: 0.08 INJECTION, SOLUTION INTRAVENOUS at 11:58

## 2023-06-09 RX ADMIN — TETROFOSMIN 1 DOSE: 1.38 INJECTION, POWDER, LYOPHILIZED, FOR SOLUTION INTRAVENOUS at 11:58

## 2023-06-09 RX ADMIN — CEFTRIAXONE SODIUM 1 G: 1 INJECTION, SOLUTION INTRAVENOUS at 01:09

## 2023-06-09 RX ADMIN — NITROGLYCERIN 1 PATCH: 0.1 PATCH TRANSDERMAL at 14:27

## 2023-06-09 RX ADMIN — FUROSEMIDE 40 MG: 10 INJECTION, SOLUTION INTRAMUSCULAR; INTRAVENOUS at 21:22

## 2023-06-09 RX ADMIN — LISINOPRIL 5 MG: 5 TABLET ORAL at 14:18

## 2023-06-09 RX ADMIN — ASPIRIN 81 MG: 81 TABLET, COATED ORAL at 14:18

## 2023-06-09 RX ADMIN — SENNOSIDES AND DOCUSATE SODIUM 2 TABLET: 50; 8.6 TABLET ORAL at 21:22

## 2023-06-09 RX ADMIN — Medication 10 ML: at 21:22

## 2023-06-09 RX ADMIN — SENNOSIDES AND DOCUSATE SODIUM 2 TABLET: 50; 8.6 TABLET ORAL at 14:17

## 2023-06-09 RX ADMIN — ATORVASTATIN CALCIUM 20 MG: 20 TABLET, FILM COATED ORAL at 14:18

## 2023-06-09 RX ADMIN — BUDESONIDE 0.5 MG: 0.5 INHALANT RESPIRATORY (INHALATION) at 06:41

## 2023-06-09 RX ADMIN — PAROXETINE HYDROCHLORIDE 40 MG: 20 TABLET, FILM COATED ORAL at 14:17

## 2023-06-09 RX ADMIN — ENOXAPARIN SODIUM 40 MG: 100 INJECTION SUBCUTANEOUS at 14:18

## 2023-06-09 RX ADMIN — ARFORMOTEROL TARTRATE 15 MCG: 15 SOLUTION RESPIRATORY (INHALATION) at 19:08

## 2023-06-09 RX ADMIN — SULFUR HEXAFLUORIDE 5 ML: KIT at 10:19

## 2023-06-09 RX ADMIN — TETROFOSMIN 1 DOSE: 1.38 INJECTION, POWDER, LYOPHILIZED, FOR SOLUTION INTRAVENOUS at 10:47

## 2023-06-09 RX ADMIN — Medication 10 ML: at 08:57

## 2023-06-09 NOTE — PROGRESS NOTES
Respiratory Therapist Broncho-Pulmonary Hygiene Progress Note      Patient Name:  Cristina Barroso  YOB: 1960    Cristina Barroso meets the qualification for Level 1 of the Bronco-Pulmonary Hygiene Protocol. This was based on my daily patient assessment and includes review of chest x-ray results, cough ability and quality, oxygenation, secretions or risk for secretion development and patient mobility.     Broncho-Pulmonary Hygiene Assessment:    Level of Movement: Actively changing positions without assistance  Alert/ oriented/ cooperative    Breath Sounds: Diminished and/or coarse rhonchi    Cough: Strong, effective    Chest X-Ray: No CXR available    Sputum Productions: None or small amount of thin or watery secretions with effective cough    History and Physical: Chronic condition    SpO2 to Oxygen Need: greater than 92% on room air or  less than 3L nasal canula    Current SpO2 is: 94% on 2L    Based on this information, I have completed the following interventions: Teach/Instruct patient on cough and deep breathe      Electronically signed by Monet Han RRT, 06/09/23, 6:42 AM EDT.

## 2023-06-09 NOTE — PLAN OF CARE
Problem: Skin Injury Risk Increased  Goal: Skin Health and Integrity  Outcome: Ongoing, Progressing  Intervention: Optimize Skin Protection  Recent Flowsheet Documentation  Taken 6/8/2023 1915 by Geneva Torre RN  Pressure Reduction Techniques: frequent weight shift encouraged  Pressure Reduction Devices: pressure-redistributing mattress utilized     Problem: Fall Injury Risk  Goal: Absence of Fall and Fall-Related Injury  Outcome: Ongoing, Progressing  Intervention: Identify and Manage Contributors  Recent Flowsheet Documentation  Taken 6/8/2023 1915 by Geneva Torre RN  Medication Review/Management: medications reviewed  Intervention: Promote Injury-Free Environment  Recent Flowsheet Documentation  Taken 6/8/2023 1915 by Geneva Torre RN  Safety Promotion/Fall Prevention: safety round/check completed     Problem: Diabetes Comorbidity  Goal: Blood Glucose Level Within Targeted Range  Outcome: Ongoing, Progressing  Goal: Blood Glucose Level Within Targeted Range  Outcome: Ongoing, Progressing     Problem: Heart Failure Comorbidity  Goal: Maintenance of Heart Failure Symptom Control  Outcome: Ongoing, Progressing  Intervention: Maintain Heart Failure-Management  Recent Flowsheet Documentation  Taken 6/8/2023 1915 by Geneva Torre RN  Medication Review/Management: medications reviewed  Goal: Maintenance of Heart Failure Symptom Control  Outcome: Ongoing, Progressing  Intervention: Maintain Heart Failure-Management  Recent Flowsheet Documentation  Taken 6/8/2023 1915 by Geneva Torre RN  Medication Review/Management: medications reviewed     Problem: Obstructive Sleep Apnea Risk or Actual Comorbidity Management  Goal: Unobstructed Breathing During Sleep  Outcome: Ongoing, Progressing     Problem: Hypertension Comorbidity  Goal: Blood Pressure in Desired Range  Outcome: Ongoing, Progressing  Intervention: Maintain Blood Pressure Management  Recent Flowsheet Documentation  Taken 6/8/2023 1915 by Yelitza  Geneva, RN  Medication Review/Management: medications reviewed     Problem: Adjustment to Illness (Heart Failure)  Goal: Optimal Coping  Outcome: Ongoing, Progressing     Problem: Cardiac Output Decreased (Heart Failure)  Goal: Optimal Cardiac Output  Outcome: Ongoing, Progressing     Problem: Dysrhythmia (Heart Failure)  Goal: Stable Heart Rate and Rhythm  Outcome: Ongoing, Progressing     Problem: Fluid Imbalance (Heart Failure)  Goal: Fluid Balance  Outcome: Ongoing, Progressing     Problem: Functional Ability Impaired (Heart Failure)  Goal: Optimal Functional Ability  Outcome: Ongoing, Progressing     Problem: Oral Intake Inadequate (Heart Failure)  Goal: Optimal Nutrition Intake  Outcome: Ongoing, Progressing     Problem: Respiratory Compromise (Heart Failure)  Goal: Effective Oxygenation and Ventilation  Outcome: Ongoing, Progressing  Intervention: Promote Airway Secretion Clearance  Recent Flowsheet Documentation  Taken 6/8/2023 1915 by Geneva Torre RN  Cough And Deep Breathing: done independently per patient     Problem: Sleep Disordered Breathing (Heart Failure)  Goal: Effective Breathing Pattern During Sleep  Outcome: Ongoing, Progressing   Goal Outcome Evaluation:

## 2023-06-09 NOTE — THERAPY EVALUATION
Patient Name: Cristina Barroso  : 1960    MRN: [id number]                              Today's Date: 2023       Admit Date: 2023    Visit Dx:     ICD-10-CM ICD-9-CM   1. Difficulty in walking  R26.2 719.7   2. Decreased activities of daily living (ADL)  Z78.9 V49.89     Patient Active Problem List   Diagnosis    Acute CHF    CAP (community acquired pneumonia)     Past Medical History:   Diagnosis Date    CHF (congestive heart failure)     Coronary artery disease     Diabetes mellitus     Hyperlipidemia     Hypertension      Past Surgical History:   Procedure Laterality Date    CARDIAC CATHETERIZATION      CARDIAC DEFIBRILLATOR PLACEMENT Left     FRACTURE SURGERY      HYSTERECTOMY      VASCULAR SURGERY        General Information       Row Name 23 1447 23 1425       OT Time and Intention    Document Type evaluation  -ES evaluation  -ES    Mode of Treatment individual therapy;occupational therapy  -ES individual therapy;occupational therapy  -ES      Row Name 23 1447 23 1425       General Information    Patient Profile Reviewed -- yes  -ES    Prior Level of Function independent:;ADL's;all household mobility;community mobility  Patient independent with B and IADLs at baseline. No device for functional mobility. Step over tub, standing shower completion. [place] in stance. No home O2 use. Denies recent falls.  -ES independent:;ADL's;all household mobility;community mobility  Patient independent with B and IADLs at baseline.  -ES    Existing Precautions/Restrictions fall  -ES --    Barriers to Rehab none identified  -ES --      Row Name 23 1447          Occupational Profile    Reason for Services/Referral (Occupational Profile) Patient is 62 yr old female admitted to [facility] on 2023 with reports of chest pain, shortness of breath. OT evaluation and treatment ordered d/t recent decline in ADLs/transfer ability and discharge planning recommendations. No previous  OT services for current condition.  -ES       Row Name 06/09/23 1447          Living Environment    People in Home spouse  -ES       Row Name 06/09/23 1447          Home Main Entrance    Number of Stairs, Main Entrance none  -ES       Row Name 06/09/23 1447          Stairs Within Home, Primary    Number of Stairs, Within Home, Primary none  -ES       Row Name 06/09/23 1447          Cognition    Orientation Status (Cognition) oriented x 3  -ES               User Key  (r) = Recorded By, (t) = Taken By, (c) = Cosigned By      Initials Name Provider Type    ES Christina Gunn, OTR/L, CSRS Occupational Therapist                     Mobility/ADL's       Row Name 06/09/23 1453          Bed Mobility    Bed Mobility supine-sit;sit-supine  -ES     Supine-Sit [place] (Bed Mobility) supervision  -ES     Sit-Supine [place] (Bed Mobility) supervision  -ES       Row Name 06/09/23 1453          Transfers    Transfers sit-stand transfer;stand-sit transfer  -ES       Row Name 06/09/23 1453          Sit-Stand Transfer    Sit-Stand [place] (Transfers) standby assist  -ES     Assistive Device (Sit-Stand Transfers) other (see comments)  no AD  -ES       Row Name 06/09/23 1453          Stand-Sit Transfer    Stand-Sit [place] (Transfers) standby assist  -ES     Assistive Device (Stand-Sit Transfers) other (see comments)  no AD  -ES       Row Name 06/09/23 1453          Functional Mobility    Functional Mobility- Ind. Level standby assist  -ES     Functional Mobility- Device other (see comments)  no AD  -ES       Row Name 06/09/23 1453          Activities of Daily Living    BADL Assessment/Intervention bathing;upper body dressing;lower body dressing;grooming;feeding;toileting  -ES       Row Name 06/09/23 1453          Bathing Assessment/Intervention    [place] Level (Bathing) bathing skills;independent  -ES       Row Name 06/09/23 1453          Upper Body Dressing Assessment/Training    [place] Level (Upper Body  Dressing) upper body dressing skills;independent  -ES       Row Name 06/09/23 1453          Lower Body Dressing Assessment/Training    [place] Level (Lower Body Dressing) lower body dressing skills;independent  -ES       Row Name 06/09/23 1453          Grooming Assessment/Training    [place] Level (Grooming) independent;grooming skills  -ES       Row Name 06/09/23 1453          Self-Feeding Assessment/Training    [place] Level (Feeding) independent;feeding skills  -ES       Row Name 06/09/23 1453          Toileting Assessment/Training    [place] Level (Toileting) toileting skills;independent  -ES               User Key  (r) = Recorded By, (t) = Taken By, (c) = Cosigned By      Initials Name Provider Type    ES Christina Gunn, OTR/L, CSRS Occupational Therapist                   Obj/Interventions       Row Name 06/09/23 1454          Sensory Assessment (Somatosensory)    Sensory Assessment (Somatosensory) sensation intact  -ES       Row Name 06/09/23 1454          Vision Assessment/Intervention    Visual Impairment/Limitations WFL  -ES       Row Name 06/09/23 1454          Range of Motion Comprehensive    General Range of Motion no range of motion deficits identified  -ES       Row Name 06/09/23 1454          Strength Comprehensive (MMT)    General Manual Muscle Testing (MMT) Assessment no strength deficits identified  -ES     Comment, General Manual Muscle Testing (MMT) Assessment BUEs assessed 4/5  -ES       Row Name 06/09/23 1454          Motor Skills    Motor Skills coordination;functional endurance  -ES     Coordination WFL  -ES     Functional Endurance good  -ES       Row Name 06/09/23 1454          Balance    Balance Assessment sitting dynamic balance;standing dynamic balance  -ES     Dynamic Sitting Balance independent  -ES     Position, Sitting Balance unsupported;sitting edge of bed  -ES     Dynamic Standing Balance standby assist  -ES     Position/Device Used, Standing Balance  unsupported;other (see comments)  no AD  -ES               User Key  (r) = Recorded By, (t) = Taken By, (c) = Cosigned By      Initials Name Provider Type    Christina Todd OTR/L, DAVID Occupational Therapist                   Goals/Plan    No documentation.                  Clinical Impression       Row Name 06/09/23 1501          Plan of Care Review    Plan of Care Reviewed With patient  -ES     Progress no change  -ES     Outcome Evaluation Patient presents at or near baseline functional status with no functional deficits related to strength, balance, transfers or mobility that impede patient independence with activities of daily living.  No indicated need for skilled occupational therapy intervention in the acute care setting.  Occupational therapy will sign off at this time. Recommend return home when medically cleared to do so.  -ES       Row Name 06/09/23 1501          Therapy Assessment/Plan (OT)    Criteria for Skilled Therapeutic Interventions Met (OT) no problems identified which require skilled intervention  -ES     Therapy Frequency (OT) evaluation only  -ES       Row Name 06/09/23 1501          Therapy Plan Review/Discharge Plan (OT)    Anticipated Discharge Disposition (OT) home  -ES       Row Name 06/09/23 1501          Vital Signs    O2 Delivery Pre Treatment room air  -ES     O2 Delivery Intra Treatment room air  -ES     O2 Delivery Post Treatment room air  -ES               User Key  (r) = Recorded By, (t) = Taken By, (c) = Cosigned By      Initials Name Provider Type    Christina Todd OTR/L, DAVID Occupational Therapist                   Outcome Measures       Row Name 06/09/23 1506          How much help from another is currently needed...    Putting on and taking off regular lower body clothing? 4  -ES     Bathing (including washing, rinsing, and drying) 4  -ES     Toileting (which includes using toilet bed pan or urinal) 4  -ES     Putting on and taking off regular upper body clothing 4   -ES     Taking care of personal grooming (such as brushing teeth) 4  -ES     Eating meals 4  -ES     AM-PAC 6 Clicks Score (OT) 24  -ES       Row Name 06/09/23 1200 06/09/23 0751       How much help from another person do you currently need...    Turning from your back to your side while in flat bed without using bedrails? 4  -CRISTINA 3  -RG    Moving from lying on back to sitting on the side of a flat bed without bedrails? 4  -CRISTINA 3  -RG    Moving to and from a bed to a chair (including a wheelchair)? 4  -CRISTINA 3  -RG    Standing up from a chair using your arms (e.g., wheelchair, bedside chair)? 4  -CRISTINA 3  -RG    Climbing 3-5 steps with a railing? 4  -CRISTINA 3  -RG    To walk in hospital room? 4  -CRISTINA 3  -RG    AM-PAC 6 Clicks Score (PT) 24  -CRISTINA 18  -RG    Highest level of mobility 8 --> Walked 250 feet or more  -CRISTINA 6 --> Walked 10 steps or more  -RG      Row Name 06/09/23 1506 06/09/23 1200       Functional Assessment    Outcome Measure Options AM-PAC 6 Clicks Daily Activity (OT);Optimal Instrument  -ES AM-PAC 6 Clicks Basic Mobility (PT)  -CRISTINA      Row Name 06/09/23 1506          Optimal Instrument    Optimal Instrument Optimal - 3  -ES     Bending/Stooping 1  -ES     Standing 1  -ES     Reaching 1  -ES     From the list, choose the 3 activities you would most like to be able to do without any difficulty Bending/stooping;Standing;Reaching  -ES     Total Score Optimal - 3 3  -ES               User Key  (r) = Recorded By, (t) = Taken By, (c) = Cosigned By      Initials Name Provider Type    Dontrell Santana, PT Physical Therapist    Christina Todd OTR/L, CSRS Occupational Therapist    RG Alyx Rothman RN Registered Nurse                      OT Recommendation and Plan  Therapy Frequency (OT): evaluation only  Plan of Care Review  Plan of Care Reviewed With: patient  Progress: no change  Outcome Evaluation: Patient presents at or near baseline functional status with no functional deficits related to strength, balance,  transfers or mobility that impede patient independence with activities of daily living.  No indicated need for skilled occupational therapy intervention in the acute care setting.  Occupational therapy will sign off at this time. Recommend return home when medically cleared to do so.     Time Calculation:    Time Calculation- OT       Row Name 06/09/23 1507             Time Calculation- OT    OT Received On 06/09/23  -ES      OT Goal Re-Cert Due Date 06/18/23  -ES         Untimed Charges    OT Eval/Re-eval Minutes 32  -ES         Total Minutes    Untimed Charges Total Minutes 32  -ES       Total Minutes 32  -ES                User Key  (r) = Recorded By, (t) = Taken By, (c) = Cosigned By      Initials Name Provider Type    ES Christina Gunn, OTR/L, CSRS Occupational Therapist                  Therapy Charges for Today       Code Description Service Date Service Provider Modifiers Qty    [id number] HC OT EVAL LOW COMPLEXITY 3 6/9/2023 Christina Gunn, OTR/L, CSRS GO 1                 Christina Gunn OTR/L, CSRS  6/9/2023

## 2023-06-09 NOTE — CASE MANAGEMENT/SOCIAL WORK
Discharge Planning Assessment   Luis     Patient Name: Cristina Barroso  MRN: [id number]  Today's Date: 6/9/2023    Admit Date: 6/8/2023    Plan: patient presented with shortness of air, patient has a history of a pacemaker. Verified pcp and pharm. Patient reports she lives at home with her spouse and 2 dogs and is independent usually. Patient denies additional needs at this time, patient plans to return home at discharge   Discharge Needs Assessment       Row Name 06/09/23 1512       Living Environment    People in Home spouse    Current Living Arrangements home    Potentially Unsafe Housing Conditions none    Primary Care Provided by self    Family Caregiver if Needed spouse    Quality of Family Relationships helpful;involved;supportive    Able to Return to Prior Arrangements yes       Resource/Environmental Concerns    Resource/Environmental Concerns none    Transportation Concerns none       Food Insecurity    Within the past 12 months, you worried that your food would run out before you got the money to buy more. Never true    Within the past 12 months, the food you bought just didn't last and you didn't have money to get more. Never true       Transition Planning    Patient/Family Anticipates Transition to home with family    Patient/Family Anticipated Services at Transition none    Transportation Anticipated family or friend will provide       Discharge Needs Assessment    Readmission Within the Last 30 Days no previous admission in last 30 days    Equipment Currently Used at Home none    Concerns to be Addressed no discharge needs identified    Anticipated Changes Related to Illness none    Equipment Needed After Discharge none                   Discharge Plan       Row Name 06/09/23 1518       Plan    Plan patient presented with shortness of air, patient has a history of a pacemaker. Verified pcp and pharm. Patient reports she lives at home with her spouse and 2 dogs and is independent usually. Patient  denies additional needs at this time, patient plans to return home at discharge    Final Discharge Disposition Code 01 - home or self-care                  Continued Care and Services - Admitted Since 6/8/2023    Coordination has not been started for this encounter.          Demographic Summary       Row Name 06/09/23 1512       General Information    Admission Type inpatient    Arrived From home;emergency department    Referral Source admission list    Reason for Consult discharge planning    Preferred Language English       Contact Information    Permission Granted to Share Info With family/designee    Contact Information Obtained for                    Functional Status       Row Name 06/09/23 1512       Functional Status    Usual Activity Tolerance good    Current Activity Tolerance good       Assessment of Health Literacy    How often do you have someone help you read hospital materials? Never    How often do you have problems learning about your medical condition because of difficulty understanding written information? Never    How often do you have a problem understanding what is told to you about your medical condition? Never    How confident are you filling out medical forms by yourself? Quite a bit    Health Literacy Good       Functional Status, IADL    Medications independent    Meal Preparation independent    Housekeeping independent    Laundry independent    Shopping independent;assistive person       Mental Status    General Appearance WDL WDL       Mental Status Summary    Recent Changes in Mental Status/Cognitive Functioning no changes                   Psychosocial    No documentation.                  Abuse/Neglect    No documentation.                  Legal    No documentation.                  Substance Abuse    No documentation.                  Patient Forms    No documentation.                     Radha Larose RN

## 2023-06-09 NOTE — PROGRESS NOTES
[facility]   Hospitalist Progress Note  Date: 2023  Patient Name: Cristina Barroso  : 1960  MRN: [id number]  Date of admission: 2023      Subjective   Subjective     Chief Complaint: Shortness of air got worse last night along with chest tightness       Summary:   Cristina Barroso is a 62 y.o. female with past medical history of diabetes mellitus, coronary disease s/p CABG, CHF, hyperlipidemia and hypertension.  Patient has been having dizziness vision for sometimes.  Yesterday got worse with minimal exertion or even at rest.  She also has been experiencing occasional PND.  She has chronic orthopnea.  Denies any leg swelling or weight gain.  Last night patient had chest tightness 10/10 it felt like somebody squeezing her lungs and mid chest area going up into her neck and jaw.  Lasted for few hours and relieved by using her 's oxygen.  No chest discomfort right now.  Patient has been having cough productive of green mucus for several days.  No fever but chills.  Patient has been wheezing.  Patient had nausea but no vomiting diarrhea abdominal complaints or urinary problems.  Patient has been feeling tired with minimal exertion.  For the symptoms she was seen at [facility] ED.  Work-up showed hypoxemia with 88% saturation on room air with 3 L 95%.  Troponin is up 309 with normal being 51.  proBNP is elevated at 1978 normal being 125.  White cell count is 11,500.  EKG no acute finding.  CT chest showed right pleural effusion with bilateral groundglass opacities indicative of pulm edema.  She used to see Dr. Gan in the past for heart issues until insurance issues.  Her last stress test was many years ago.  Outside ED physician consulted cardiologist at [facility] who agreed to see patient in consultation.  Hospitalist has been called to admit her for further evaluation.  Patient evaluated by cardiology and had stress test on      Interval Followup:   Vital signs  stable 95% saturation on 2 L.  States shortness of air is improving.  Cough improving  Remains weak  Good urine output -1.4 L.  Has Lundy catheter placed in the hospital.    Review of Systems   All systems were reviewed and negative except for: Summary and interval follow-up    Objective   Objective     Vitals:   Temp:  [97.9 °F (36.6 °C)-98.5 °F (36.9 °C)] 98.2 °F (36.8 °C)  Heart Rate:  [60-88] 65  Resp:  [14-20] 16  BP: (111-130)/(55-73) 111/64  Flow (L/min):  [2] 2  Physical Exam    Constitutional: Awake, alert, no acute distress              Eyes: Pupils equal, sclerae anicteric, no conjunctival injection              HENT: NCAT, mucous membranes moist              Neck: Supple, no thyromegaly, no lymphadenopathy, trachea midline              Respiratory: Occasional wheezing in the front.  Bibasilar Rales , nonlabored respirations               Cardiovascular: RRR, no murmurs, rubs, or gallops, palpable pedal pulses bilaterally              Gastrointestinal: Positive bowel sounds, soft, nontender, nondistended              Musculoskeletal: No bilateral ankle edema, no clubbing or cyanosis to extremities              Psychiatric: Appropriate affect, cooperative              Neurologic: Oriented x 3, strength symmetric in all extremities, Cranial Nerves grossly intact to confrontation, speech clear              Skin: No rashes        Result Review    Result Review:  I have personally reviewed the results for the past 24 hours and agree with these findings:  [x]  Laboratory  [x]  Microbiology  [x]  Radiology  [x]  EKG/Telemetry   []  Cardiology/Vascular   []  Pathology  [x]  Old records  [x]  Other: Medications    Assessment & Plan   Assessment / Plan     Assessment:    Acute on chronic CHF.  EF not known.  Bibasilar community-acquired pneumonia.  Hypertension.  Hyperlipidemia.  LDL of 80  Diabetes mellitus..  Hemoglobin A1c of 6.5%  Elevated troponin rule out ACS..        Plan:   Continue oxygen to keep sats more  than 90%  Low-salt diet, fluid restriction, strict input output and daily weights.  IV Lasix.  Monitor kidney function  Nitropaste.  Continue home Coreg and lisinopril.  Continue home aspirin and statin.  Check 2D echo.  Discussed with cardiology to evaluate patient for chest pain and CHF.  Appreciate input.  Await stress test results  Trend troponin.  DC IV Rocephin.  Continue p.o. Zithromax.  Sputum culture pending.  Nebulizer treatment.  Bronchodilator and bronchopulmonary hygiene protocol  Hold off steroids   procalcitonin negative.  MRSA PCR, Legionella and strep pneumonia urine antigen are negative.  Sliding-scale insulin.  Continue telemetry.  PT OT.  DC Lundy catheter.  Apply external catheter.      Discussed plan with RN and .  Return home once stable and cleared by cardiology    DVT prophylaxis:  Medical DVT prophylaxis orders are present.    CODE STATUS:   Code Status (Patient has no pulse and is not breathing): CPR (Attempt to Resuscitate)  Medical Interventions (Patient has pulse or is breathing): Full Support      Part of this note may be an electronic transcription/translation of spoken language to printed text using the Dragon Dictation System.     Electronically signed by Tunde Light MD, 06/09/23, 5:13 PM EDT.

## 2023-06-09 NOTE — PROGRESS NOTES
CARDIOLOGY  INPATIENT PROGRESS NOTE         [facility] UNIT    6/9/2023      PATIENT IDENTIFICATION:   Name:  Cristina Barroso      MRN:  [id number]     62 y.o.  female             Reason for visit: Shortness of breath, chest tightness      SUBJECTIVE:    The patient is feeling a bit better, vital signs stable, diuresed reasonably overnight.  OBJECTIVE:  Vitals:    06/08/23 2051 06/08/23 2302 06/09/23 0300 06/09/23 0640   BP: 122/55 130/73 122/63    BP Location: Right arm Right arm Right arm    Patient Position: Lying Lying Lying    Pulse: 70 77 70 74   Resp: 20 20 14 16   Temp: 98.5 °F (36.9 °C) 97.9 °F (36.6 °C) 98.5 °F (36.9 °C)    TempSrc: Oral Oral Oral    SpO2: 96% 96% 96% 94%   Weight:       Height:               Body mass index is 34.92 kg/m².    Intake/Output Summary (Last 24 hours) at 6/9/2023 0833  Last data filed at 6/9/2023 0300  Gross per 24 hour   Intake 534 ml   Output 1950 ml   Net -1416 ml       Telemetry: Sinus rhythm    Review of Systems      Exam:  General appearance no acute distress    well nourished   HEENT sclerae non-icteric    lips not cyanotic   Respiratory rate and depth normal    normal breath sounds    no rales, no wheeze   Cardiovascular JVP normal    regular rhythm    S1 normal, S2 normal    no S3, no S4    no murmur    lower extremity edema: Mild   Abdominal bowel sounds normal    abdomen soft, non-tender    no hepatosplenomegaly   Neuro-psych oriented to person, place, time    cooperative     Allergies   Allergen Reactions    Demerol [Meperidine] Hallucinations    Oxycodone Hallucinations     Scheduled meds:  arformoterol, , ,   arformoterol, 15 mcg, Nebulization, BID - RT  aspirin, 81 mg, Oral, Daily  atorvastatin, 20 mg, Oral, Daily  azithromycin, 250 mg, Oral, Daily  budesonide, , ,   budesonide, 0.5 mg, Nebulization, BID - RT  carvedilol, 12.5 mg, Oral, BID With Meals  cefTRIAXone, 1 g, Intravenous, Q24H  enoxaparin, 40 mg, Subcutaneous,  Daily  furosemide, 40 mg, Intravenous, BID  insulin lispro, 2-7 Units, Subcutaneous, 4x Daily AC & at Bedtime  lisinopril, 5 mg, Oral, Daily  nitroglycerin, 1 patch, Transdermal, Daily  PARoxetine, 40 mg, Oral, QAM  senna-docusate sodium, 2 tablet, Oral, BID  sodium chloride, 10 mL, Intravenous, Q12H      IV meds:                         Data Review:  Results from last 7 days   Lab Units 06/09/23 0414 06/08/23  1708   SODIUM mmol/L 137 140   BUN mg/dL 23 16   CREATININE mg/dL 1.11* 0.96   GLUCOSE mg/dL 190* 192*             Estimated Creatinine Clearance: 54.4 mL/min (A) (by C-G formula based on SCr of 1.11 mg/dL (H)).  Results from last 7 days   Lab Units 06/09/23  0414   WBC 10*3/mm3 10.71   HEMOGLOBIN g/dL 13.0             No results found for: DIGOXIN   Lab Results   Component Value Date    TSH 0.913 06/08/2023     Results from last 7 days   Lab Units 06/09/23  0414   CHOLESTEROL mg/dL 151   HDL CHOL mg/dL 51               Imaging (last 24 hr):   Imaging Results (Last 24 Hours)       ** No results found for the last 24 hours. **              ASSESSMENT:     Acute CHF    CAP (community acquired pneumonia)          PLAN:  1.  Nuclear stress test today to evaluate for high risk ischemia, echo pending  2.  Continue IV Lasix today, can probably change to oral tomorrow  3.  Medical management of coronary artery disease otherwise  4.  Anticipate that she should be able to go home in the next 24 to 48 hours if stress testing is not high risk            Phil Marie MD  6/9/2023    08:33 EDT

## 2023-06-10 LAB
ALBUMIN SERPL-MCNC: 4 G/DL (ref 3.5–5.2)
ANION GAP SERPL CALCULATED.3IONS-SCNC: 9.7 MMOL/L (ref 5–15)
BACTERIA SPEC RESP CULT: NORMAL
BUN SERPL-MCNC: 31 MG/DL (ref 8–23)
BUN/CREAT SERPL: 23.1 (ref 7–25)
CALCIUM SPEC-SCNC: 9.5 MG/DL (ref 8.6–10.5)
CHLORIDE SERPL-SCNC: 94 MMOL/L (ref 98–107)
CO2 SERPL-SCNC: 30.3 MMOL/L (ref 22–29)
CREAT SERPL-MCNC: 1.34 MG/DL (ref 0.57–1)
EGFRCR SERPLBLD CKD-EPI 2021: 45.2 ML/MIN/1.73
GLUCOSE BLDC GLUCOMTR-MCNC: 111 MG/DL (ref 70–99)
GLUCOSE BLDC GLUCOMTR-MCNC: 128 MG/DL (ref 70–99)
GLUCOSE BLDC GLUCOMTR-MCNC: 139 MG/DL (ref 70–99)
GLUCOSE BLDC GLUCOMTR-MCNC: 142 MG/DL (ref 70–99)
GLUCOSE SERPL-MCNC: 267 MG/DL (ref 65–99)
GRAM STN SPEC: NORMAL
PHOSPHATE SERPL-MCNC: 2.9 MG/DL (ref 2.5–4.5)
POTASSIUM SERPL-SCNC: 3.2 MMOL/L (ref 3.5–5.2)
SODIUM SERPL-SCNC: 134 MMOL/L (ref 136–145)
WHOLE BLOOD HOLD SPECIMEN: NORMAL

## 2023-06-10 PROCEDURE — 25010000002 FUROSEMIDE PER 20 MG: Performed by: INTERNAL MEDICINE

## 2023-06-10 PROCEDURE — 82948 REAGENT STRIP/BLOOD GLUCOSE: CPT

## 2023-06-10 PROCEDURE — 94799 UNLISTED PULMONARY SVC/PX: CPT

## 2023-06-10 PROCEDURE — 25010000002 ENOXAPARIN PER 10 MG: Performed by: INTERNAL MEDICINE

## 2023-06-10 PROCEDURE — 80069 RENAL FUNCTION PANEL: CPT | Performed by: INTERNAL MEDICINE

## 2023-06-10 PROCEDURE — 99233 SBSQ HOSP IP/OBS HIGH 50: CPT | Performed by: INTERNAL MEDICINE

## 2023-06-10 PROCEDURE — 94664 DEMO&/EVAL PT USE INHALER: CPT

## 2023-06-10 RX ORDER — POTASSIUM CHLORIDE 750 MG/1
40 CAPSULE, EXTENDED RELEASE ORAL 2 TIMES DAILY WITH MEALS
Status: COMPLETED | OUTPATIENT
Start: 2023-06-10 | End: 2023-06-10

## 2023-06-10 RX ADMIN — PAROXETINE HYDROCHLORIDE 40 MG: 20 TABLET, FILM COATED ORAL at 08:52

## 2023-06-10 RX ADMIN — BUDESONIDE 0.5 MG: 0.5 INHALANT RESPIRATORY (INHALATION) at 18:46

## 2023-06-10 RX ADMIN — ASPIRIN 81 MG: 81 TABLET, COATED ORAL at 08:52

## 2023-06-10 RX ADMIN — Medication 10 ML: at 20:48

## 2023-06-10 RX ADMIN — POTASSIUM CHLORIDE 40 MEQ: 10 CAPSULE, COATED, EXTENDED RELEASE ORAL at 17:01

## 2023-06-10 RX ADMIN — Medication 10 ML: at 08:53

## 2023-06-10 RX ADMIN — ARFORMOTEROL TARTRATE 15 MCG: 15 SOLUTION RESPIRATORY (INHALATION) at 18:46

## 2023-06-10 RX ADMIN — EMPAGLIFLOZIN 10 MG: 10 TABLET, FILM COATED ORAL at 09:56

## 2023-06-10 RX ADMIN — AZITHROMYCIN MONOHYDRATE 250 MG: 250 TABLET ORAL at 08:52

## 2023-06-10 RX ADMIN — LISINOPRIL 5 MG: 5 TABLET ORAL at 08:52

## 2023-06-10 RX ADMIN — CARVEDILOL 12.5 MG: 12.5 TABLET, FILM COATED ORAL at 17:00

## 2023-06-10 RX ADMIN — ENOXAPARIN SODIUM 40 MG: 100 INJECTION SUBCUTANEOUS at 08:52

## 2023-06-10 RX ADMIN — BUDESONIDE 0.5 MG: 0.5 INHALANT RESPIRATORY (INHALATION) at 07:46

## 2023-06-10 RX ADMIN — ATORVASTATIN CALCIUM 20 MG: 20 TABLET, FILM COATED ORAL at 08:52

## 2023-06-10 RX ADMIN — POTASSIUM CHLORIDE 40 MEQ: 10 CAPSULE, COATED, EXTENDED RELEASE ORAL at 09:56

## 2023-06-10 RX ADMIN — NITROGLYCERIN 1 PATCH: 0.1 PATCH TRANSDERMAL at 09:56

## 2023-06-10 RX ADMIN — CARVEDILOL 12.5 MG: 12.5 TABLET, FILM COATED ORAL at 08:52

## 2023-06-10 RX ADMIN — ARFORMOTEROL TARTRATE 15 MCG: 15 SOLUTION RESPIRATORY (INHALATION) at 07:46

## 2023-06-10 RX ADMIN — SENNOSIDES AND DOCUSATE SODIUM 2 TABLET: 50; 8.6 TABLET ORAL at 08:52

## 2023-06-10 NOTE — PROGRESS NOTES
[facility]     Cardiology Progress Note    Patient Name: Cristina Barroso  : 1960  MRN: [id number]  Primary Care Physician:  Lyndsey Stephens APRN  Date of admission: 2023    Subjective   Subjective   Chief complaint  Chest pain, shortness of breath    HPI:  Patient Reports overall she is doing well today.    Review of Systems   All systems were reviewed and negative except for: Cardiac review of systems negative.    Objective   Objective     Vitals:   Temp:  [97.4 °F (36.3 °C)-98.4 °F (36.9 °C)] 98.2 °F (36.8 °C)  Heart Rate:  [57-65] 65  Resp:  [14-20] 16  BP: (106-148)/(64-79) 132/79  Physical Exam   Neck: no JVD, no bruit  Lungs: clear to ausculation bilaterally.  No crackles or wheezes  CV: regular rate and rhythm, no murmur  Ext: no cyanosis, clubbing or edema.  Normal bilateral LE pulses.      Scheduled Meds:arformoterol, 15 mcg, Nebulization, BID - RT  aspirin, 81 mg, Oral, Daily  atorvastatin, 20 mg, Oral, Daily  azithromycin, 250 mg, Oral, Daily  budesonide, 0.5 mg, Nebulization, BID - RT  carvedilol, 12.5 mg, Oral, BID With Meals  empagliflozin, 10 mg, Oral, Daily  enoxaparin, 40 mg, Subcutaneous, Daily  insulin lispro, 2-7 Units, Subcutaneous, 4x Daily AC & at Bedtime  lisinopril, 5 mg, Oral, Daily  nitroglycerin, 1 patch, Transdermal, Daily  PARoxetine, 40 mg, Oral, QAM  potassium chloride, 40 mEq, Oral, BID With Meals  senna-docusate sodium, 2 tablet, Oral, BID  sodium chloride, 10 mL, Intravenous, Q12H      Continuous Infusions:        Result Review    Result Review:  I have personally reviewed the results from the time of this admission to 6/10/2023 13:05 EDT and agree with these findings:  [x]  Laboratory  []  Microbiology  [x]  Radiology  [x]  EKG/Telemetry   [x]  Cardiology/Vascular   []  Pathology  []  Old records  []  Other:  Most notable findings include:     CBC          2023    04:14   CBC   WBC 10.71    RBC 4.27    Hemoglobin 13.0    Hematocrit 40.8    MCV 95.6    MCH  30.4    MCHC 31.9    RDW 15.2    Platelets 190      CMP          6/8/2023    17:08 6/9/2023    04:14 6/10/2023    04:15   CMP   Glucose 192  190  267    BUN 16  23  31    Creatinine 0.96  1.11  1.34    EGFR 67.5  56.7  45.2    Sodium 140  137  134    Potassium 4.2  4.1  3.2    Chloride 101  100  94    Calcium 9.0  9.0  9.5    Albumin   4.0    BUN/Creatinine Ratio 16.7  20.7  23.1    Anion Gap 15.9  9.7  9.7       CARDIAC LABS:      Lab 06/09/23  0414 06/08/23  1708 06/08/23  1519   PROBNP 2,739.0*  --   --    HSTROP T  --  12* 13*        Assessment & Plan   Assessment / Plan     Brief Patient Summary:  Cristina Barroso is a 62 y.o. female who has a history of coronary artery disease status post remote CABG and ischemic cardiomyopathy.    Active Hospital Problems:  Active Hospital Problems    Diagnosis     **Acute CHF     CAP (community acquired pneumonia)        Assessment:  1.  Coronary artery disease  2.  Ischemic cardiomyopathy  3.  Acute on chronic systolic congestive heart failure      Plan:   1.  Patient's IV Lasix has been held as her serum creatinine is slowly increasing.  2.  Continue the Coreg, Jardiance, lisinopril.  3.  Continue the aspirin and statin.  4.  Cardiolite shows no ischemia.  Both echo and nuke show EF in the 25 to 30% range which it has been chronically.  5.  From a cardiac standpoint do feel patient can be discharged.  Would set her up with Dr. Marie as an outpatient.  Will follow from a distance.  Call with any questions.       CODE STATUS:   Code Status (Patient has no pulse and is not breathing): CPR (Attempt to Resuscitate)  Medical Interventions (Patient has pulse or is breathing): Full Support      Electronically signed by Satya Duarte MD, 06/10/23, 1:05 PM EDT.

## 2023-06-10 NOTE — PROGRESS NOTES
[facility]   Hospitalist Progress Note  Date: 6/10/2023  Patient Name: Cristina Barroso  : 1960  MRN: [id number]  Date of admission: 2023      Subjective   Subjective     Chief Complaint: Shortness of air got worse last night along with chest tightness       Summary:   Cristina Barroso is a 62 y.o. female with past medical history of diabetes mellitus, coronary disease s/p CABG, CHF, hyperlipidemia and hypertension.  Patient has been having dizziness vision for sometimes.  Yesterday got worse with minimal exertion or even at rest.  She also has been experiencing occasional PND.  She has chronic orthopnea.  Denies any leg swelling or weight gain.  Last night patient had chest tightness 10/10 it felt like somebody squeezing her lungs and mid chest area going up into her neck and jaw.  Lasted for few hours and relieved by using her 's oxygen.  No chest discomfort right now.  Patient has been having cough productive of green mucus for several days.  No fever but chills.  Patient has been wheezing.  Patient had nausea but no vomiting diarrhea abdominal complaints or urinary problems.  Patient has been feeling tired with minimal exertion.  For the symptoms she was seen at [facility] ED.  Work-up showed hypoxemia with 88% saturation on room air with 3 L 95%.  Troponin is up 309 with normal being 51.  proBNP is elevated at 1978 normal being 125.  White cell count is 11,500.  EKG no acute finding.  CT chest showed right pleural effusion with bilateral groundglass opacities indicative of pulm edema.  She used to see Dr. Gan in the past for heart issues until insurance issues.  Her last stress test was many years ago.  Outside ED physician consulted cardiologist at [facility] who agreed to see patient in consultation.  Hospitalist has been called to admit her for further evaluation.  Patient evaluated by cardiology and had stress test on      Interval Followup:   Vital signs  stable, now on room air.  Off oxygen  States shortness of air is improving.  Cough improving able to bring up some mucus as it is breaking up  Remains weak  Good urine output - 2 L.      Review of Systems   All systems were reviewed and negative except for: Summary and interval follow-up    Objective   Objective     Vitals:   Temp:  [97.4 °F (36.3 °C)-98.4 °F (36.9 °C)] 98.2 °F (36.8 °C)  Heart Rate:  [57-65] 65  Resp:  [14-20] 16  BP: (106-148)/(64-79) 132/79  Physical Exam    Constitutional: Awake, alert, no acute distress              Eyes: Pupils equal, sclerae anicteric, no conjunctival injection              HENT: NCAT, mucous membranes moist              Neck: Supple, no thyromegaly, no lymphadenopathy, trachea midline              Respiratory: Occasional wheezing in the front.  Bibasilar Rales , nonlabored respirations               Cardiovascular: RRR, no murmurs, rubs, or gallops, palpable pedal pulses bilaterally              Gastrointestinal: Positive bowel sounds, soft, nontender, nondistended              Musculoskeletal: No bilateral ankle edema, no clubbing or cyanosis to extremities              Psychiatric: Appropriate affect, cooperative              Neurologic: Oriented x 3, strength symmetric in all extremities, Cranial Nerves grossly intact to confrontation, speech clear              Skin: No rashes        Result Review    Result Review:  I have personally reviewed the results for the past 24 hours and agree with these findings:  [x]  Laboratory  [x]  Microbiology  [x]  Radiology  [x]  EKG/Telemetry   []  Cardiology/Vascular   []  Pathology  [x]  Old records  [x]  Other: Medications    Assessment & Plan   Assessment / Plan     Assessment:    Acute on chronic combined CHF.  EF of 26 to 30% .  History of coronary artery disease/MI causing above  Bibasilar community-acquired pneumonia.  Hypertension.  Hyperlipidemia.  LDL of 80  Diabetes mellitus..  Hemoglobin A1c of 6.5%  Non-STEMI likely type II  from CHF  Renal insufficiency from diuresis     Plan:   Wean off oxygen to keep sats more than 90%  Low-salt diet, fluid restriction, strict input output and daily weights.  DC IV Lasix.  Monitor kidney function  Nitropaste.  Continue home Coreg and lisinopril.  Jardiance added  Continue home aspirin and statin.  Noted 2D echo.  Discussed with cardiology to evaluate patient for chest pain and CHF.  Appreciate input.  A stress test results noted with intermediate risk    Continue p.o. Zithromax.  Sputum culture pending.  Nebulizer treatment.  Bronchodilator and bronchopulmonary hygiene protocol  Hold off steroids   procalcitonin negative.  MRSA PCR, Legionella and strep pneumonia urine antigen are negative.  Sliding-scale insulin.  Continue telemetry.  PT OT.  Continue external catheter.      Discussed plan with RN and .  Return home once stable and cleared by cardiology    DVT prophylaxis:  Medical DVT prophylaxis orders are present.    CODE STATUS:   Code Status (Patient has no pulse and is not breathing): CPR (Attempt to Resuscitate)  Medical Interventions (Patient has pulse or is breathing): Full Support      Part of this note may be an electronic transcription/translation of spoken language to printed text using the Dragon Dictation System.       Electronically signed by Tunde Light MD, 06/10/23, 12:55 PM EDT.

## 2023-06-10 NOTE — PLAN OF CARE
Goal Outcome Evaluation:         Pt has been resting throughout the night with no signs of distress. Will continue with plan of care.

## 2023-06-10 NOTE — PLAN OF CARE
Goal Outcome Evaluation:      No changes this shift, pt breathing is better. Will continue to monitor

## 2023-06-11 ENCOUNTER — READMISSION MANAGEMENT (OUTPATIENT)
Dept: CALL CENTER | Facility: HOSPITAL | Age: 63
End: 2023-06-11
Payer: MEDICAID

## 2023-06-11 VITALS
BODY MASS INDEX: 34.85 KG/M2 | OXYGEN SATURATION: 93 % | SYSTOLIC BLOOD PRESSURE: 113 MMHG | DIASTOLIC BLOOD PRESSURE: 68 MMHG | TEMPERATURE: 97.2 F | WEIGHT: 189.38 LBS | HEART RATE: 62 BPM | HEIGHT: 62 IN | RESPIRATION RATE: 18 BRPM

## 2023-06-11 PROBLEM — I10 HYPERTENSION: Status: ACTIVE | Noted: 2023-06-11

## 2023-06-11 PROBLEM — I50.22 CHRONIC SYSTOLIC HEART FAILURE: Status: ACTIVE | Noted: 2023-06-11

## 2023-06-11 PROBLEM — F32.A DEPRESSION: Status: ACTIVE | Noted: 2023-06-11

## 2023-06-11 PROBLEM — E11.9 DIABETES MELLITUS: Status: ACTIVE | Noted: 2023-06-11

## 2023-06-11 LAB
ALBUMIN SERPL-MCNC: 3.9 G/DL (ref 3.5–5.2)
ANION GAP SERPL CALCULATED.3IONS-SCNC: 9.4 MMOL/L (ref 5–15)
BUN SERPL-MCNC: 30 MG/DL (ref 8–23)
BUN/CREAT SERPL: 25.9 (ref 7–25)
CALCIUM SPEC-SCNC: 9.3 MG/DL (ref 8.6–10.5)
CHLORIDE SERPL-SCNC: 100 MMOL/L (ref 98–107)
CO2 SERPL-SCNC: 24.6 MMOL/L (ref 22–29)
CREAT SERPL-MCNC: 1.16 MG/DL (ref 0.57–1)
EGFRCR SERPLBLD CKD-EPI 2021: 53.7 ML/MIN/1.73
GLUCOSE BLDC GLUCOMTR-MCNC: 105 MG/DL (ref 70–99)
GLUCOSE BLDC GLUCOMTR-MCNC: 133 MG/DL (ref 70–99)
GLUCOSE SERPL-MCNC: 106 MG/DL (ref 65–99)
MAGNESIUM SERPL-MCNC: 1.8 MG/DL (ref 1.6–2.4)
PHOSPHATE SERPL-MCNC: 3.4 MG/DL (ref 2.5–4.5)
POTASSIUM SERPL-SCNC: 4.4 MMOL/L (ref 3.5–5.2)
SODIUM SERPL-SCNC: 134 MMOL/L (ref 136–145)
WHOLE BLOOD HOLD SPECIMEN: NORMAL

## 2023-06-11 PROCEDURE — 25010000002 ENOXAPARIN PER 10 MG: Performed by: INTERNAL MEDICINE

## 2023-06-11 PROCEDURE — 94664 DEMO&/EVAL PT USE INHALER: CPT

## 2023-06-11 PROCEDURE — 94799 UNLISTED PULMONARY SVC/PX: CPT

## 2023-06-11 PROCEDURE — 82948 REAGENT STRIP/BLOOD GLUCOSE: CPT

## 2023-06-11 PROCEDURE — 80069 RENAL FUNCTION PANEL: CPT | Performed by: INTERNAL MEDICINE

## 2023-06-11 PROCEDURE — 83735 ASSAY OF MAGNESIUM: CPT | Performed by: INTERNAL MEDICINE

## 2023-06-11 RX ORDER — ASPIRIN 81 MG/1
81 TABLET ORAL DAILY
Qty: 30 TABLET | Refills: 0 | Status: SHIPPED | OUTPATIENT
Start: 2023-06-12 | End: 2023-07-12

## 2023-06-11 RX ORDER — AZITHROMYCIN 250 MG/1
TABLET, FILM COATED ORAL
Qty: 4 TABLET | Refills: 0 | Status: SHIPPED | OUTPATIENT
Start: 2023-06-12

## 2023-06-11 RX ORDER — FUROSEMIDE 20 MG/1
20 TABLET ORAL DAILY
Qty: 30 TABLET | Refills: 0 | Status: SHIPPED | OUTPATIENT
Start: 2023-06-11 | End: 2023-07-11

## 2023-06-11 RX ORDER — NITROGLYCERIN 0.1MG/HR
1 PATCH, TRANSDERMAL 24 HOURS TRANSDERMAL DAILY
Qty: 30 PATCH | Refills: 0 | Status: SHIPPED | OUTPATIENT
Start: 2023-06-12 | End: 2023-07-12

## 2023-06-11 RX ORDER — NITROGLYCERIN 0.4 MG/1
0.4 TABLET SUBLINGUAL
Qty: 100 TABLET | Refills: 0 | Status: SHIPPED | OUTPATIENT
Start: 2023-06-11

## 2023-06-11 RX ADMIN — CARVEDILOL 12.5 MG: 12.5 TABLET, FILM COATED ORAL at 08:13

## 2023-06-11 RX ADMIN — ARFORMOTEROL TARTRATE 15 MCG: 15 SOLUTION RESPIRATORY (INHALATION) at 06:38

## 2023-06-11 RX ADMIN — BUDESONIDE 0.5 MG: 0.5 INHALANT RESPIRATORY (INHALATION) at 06:38

## 2023-06-11 RX ADMIN — NITROGLYCERIN 1 PATCH: 0.1 PATCH TRANSDERMAL at 08:13

## 2023-06-11 RX ADMIN — EMPAGLIFLOZIN 10 MG: 10 TABLET, FILM COATED ORAL at 08:14

## 2023-06-11 RX ADMIN — SENNOSIDES AND DOCUSATE SODIUM 2 TABLET: 50; 8.6 TABLET ORAL at 08:13

## 2023-06-11 RX ADMIN — Medication 10 ML: at 08:13

## 2023-06-11 RX ADMIN — ACETAMINOPHEN 650 MG: 325 TABLET ORAL at 03:23

## 2023-06-11 RX ADMIN — ENOXAPARIN SODIUM 40 MG: 100 INJECTION SUBCUTANEOUS at 08:13

## 2023-06-11 RX ADMIN — ASPIRIN 81 MG: 81 TABLET, COATED ORAL at 08:13

## 2023-06-11 RX ADMIN — PAROXETINE HYDROCHLORIDE 40 MG: 20 TABLET, FILM COATED ORAL at 06:19

## 2023-06-11 RX ADMIN — AZITHROMYCIN MONOHYDRATE 250 MG: 250 TABLET ORAL at 08:13

## 2023-06-11 RX ADMIN — ATORVASTATIN CALCIUM 20 MG: 20 TABLET, FILM COATED ORAL at 08:13

## 2023-06-11 RX ADMIN — LISINOPRIL 5 MG: 5 TABLET ORAL at 08:14

## 2023-06-11 NOTE — DISCHARGE SUMMARY
[facility]         HOSPITALIST  DISCHARGE SUMMARY    Patient Name: Cristina Barroso  : 1960  MRN: [id number]    Date of Admission: 2023  Date of Discharge:2023    Primary Care Physician: Lyndsey Stephens APRN    Consults       Date and Time Order Name Status Description    2023  2:52 PM Inpatient Cardiology Consult              Active and Resolved Hospital Problems:    Acute on chronic CHF.  EF not known.  Bibasilar community-acquired pneumonia.  Hypertension.  Hyperlipidemia.  LDL of 80  Diabetes mellitus..  Hemoglobin A1c of 6.5%  Elevated troponin; rule out ACS  Ischemic cardiomyopathy  Active Hospital Problems    Diagnosis POA    **Acute CHF [I50.9] Yes    Chronic systolic heart failure (CMS/HCC) [I50.22] Unknown    CAP (community acquired pneumonia) [J18.9] Unknown      Resolved Hospital Problems   No resolved problems to display.       Hospital Course     Hospital Course:  62-year-old female with diabetes, CAD status post CABG, with chronic systolic CHF, hypertension, dyslipidemia, belies from 2023 to 2023 for treatment and management of acute on chronic combined CHF with EF 26 to 30%, she was not taking her Lasix properly at home, cardiology was consulted, her creatinine was monitored had a slight rise but came back down to 1.16, resumed on diuresis upon disposition.  Referred to CHF clinic for further evaluation and management.  High risk for readmission due to medical noncompliance with medications.  She is treated for community-acquired pneumonia, to complete course of antibiotics.      Day of Discharge     Vital Signs:  Temp:  [97.2 °F (36.2 °C)-98.1 °F (36.7 °C)] 97.2 °F (36.2 °C)  Heart Rate:  [58-68] 62  Resp:  [16-18] 18  BP: (113-152)/(64-76) 113/68    Review of systems:  All systems reviewed negative except for shortness of breath with exertion    Physical exam:   Constitutional: Awake, alert, no acute distress   Eyes: Pupils equal, sclerae anicteric,  no conjunctival injection   HENT: NCAT, mucous membranes moist   Neck: Supple, no thyromegaly, no lymphadenopathy, trachea midline   Respiratory: Clear to auscultation bilaterally, nonlabored respirations    Cardiovascular: RRR, no murmurs, rubs, or gallops, palpable pedal pulses bilaterally   Gastrointestinal: Positive bowel sounds, soft, nontender, nondistended   Musculoskeletal: No bilateral ankle edema, no clubbing or cyanosis to extremities   Psychiatric: Appropriate affect, cooperative   Neurologic: Oriented x 3, strength symmetric in all extremities, Cranial Nerves grossly intact to confrontation, speech clear   Skin: No rashes visible on exposed skin          Discharge Details        Discharge Medications        New Medications        Instructions Start Date   azithromycin 250 MG tablet  Commonly known as: ZITHROMAX   Pneumonia   Start Date: June 12, 2023     empagliflozin 10 MG tablet tablet  Commonly known as: JARDIANCE   10 mg, Oral, Daily   Start Date: June 12, 2023     furosemide 20 MG tablet  Commonly known as: Lasix   20 mg, Oral, Daily      nitroglycerin 0.4 MG SL tablet  Commonly known as: NITROSTAT   0.4 mg, Sublingual, Every 5 Minutes PRN, Take no more than 3 doses in 15 minutes.      nitroglycerin 0.1 MG/HR patch  Commonly known as: NITRODUR   1 patch, Transdermal, Daily   Start Date: June 12, 2023            Changes to Medications        Instructions Start Date   aspirin 81 MG EC tablet  What changed:   medication strength  how much to take   81 mg, Oral, Daily   Start Date: June 12, 2023            Continue These Medications        Instructions Start Date   carvedilol 12.5 MG tablet  Commonly known as: COREG   12.5 mg, Oral, 2 Times Daily With Meals      lisinopril 5 MG tablet  Commonly known as: PRINIVIL,ZESTRIL   5 mg, Oral, Daily      meloxicam 15 MG tablet  Commonly known as: MOBIC   15 mg, Oral, Daily      metFORMIN  MG 24 hr tablet  Commonly known as: GLUCOPHAGE-XR   500 mg, Oral,  Daily With Breakfast      PARoxetine 40 MG tablet  Commonly known as: PAXIL   1 tablet, Oral, Daily      simvastatin 40 MG tablet  Commonly known as: ZOCOR   40 mg, Oral, Nightly               Allergies   Allergen Reactions    Demerol [Meperidine] Hallucinations    Oxycodone Hallucinations       Discharge Disposition:  Home or Self Care    Diet:  Hospital:  Diet Order   Procedures    Diet: Cardiac Diets; Healthy Heart (2-3 Na+); Texture: Regular Texture (IDDSI 7); Fluid Consistency: Thin (IDDSI 0)       Discharge Activity:       CODE STATUS:  Code Status and Medical Interventions:   Ordered at: 06/08/23 8137     Code Status (Patient has no pulse and is not breathing):    CPR (Attempt to Resuscitate)     Medical Interventions (Patient has pulse or is breathing):    Full Support         No future appointments.    Additional Instructions for the Follow-ups that You Need to Schedule       Discharge Follow-up with PCP   As directed       Currently Documented PCP:    Lyndsey Stephens APRN    PCP Phone Number:    [phone]     Follow Up Details: 3 to 7 days         Discharge Follow-up with Specified Provider: Dr Marie in 1 week   As directed      To: Dr Marie in 1 week                 Pertinent  and/or Most Recent Results     PROCEDURES:   Adult Transthoracic Echo Complete w/ Color, Spectral and Contrast if necessary per protocol    Result Date: 6/9/2023    Left ventricular ejection fraction appears to be 26 - 30%.   The left ventricular cavity is moderately dilated.   Left ventricular diastolic function is consistent with (grade II w/high LAP) pseudonormalization.   Mildly reduced right ventricular systolic function noted.   The right ventricular cavity is mildly dilated.   The left atrial cavity is moderately dilated.   Left atrial volume is moderately increased.   Estimated right ventricular systolic pressure from tricuspid regurgitation is normal (<35 mmHg).     Stress Test With Myocardial Perfusion One  Day    Result Date: 6/9/2023    Left ventricular ejection fraction is severely reduced (Calculated EF = 22%).   Myocardial perfusion imaging indicates a large-sized infarct located in the inferior wall, lateral wall and apex with no significant ischemia noted.   Impressions are consistent with an intermediate risk study.   Findings consistent with a normal ECG stress test.        LAB RESULTS:      Lab 06/09/23 0414 06/08/23  1519   WBC 10.71  --    HEMOGLOBIN 13.0  --    HEMATOCRIT 40.8  --    PLATELETS 190  --    NEUTROS ABS 9.00*  --    IMMATURE GRANS (ABS) 0.05  --    LYMPHS ABS 0.95  --    MONOS ABS 0.70  --    EOS ABS 0.00  --    MCV 95.6  --    PROCALCITONIN  --  0.24   LACTATE  --  0.9         Lab 06/11/23  0425 06/10/23  0415 06/09/23  0414 06/08/23  1708 06/08/23  1519   SODIUM 134* 134* 137 140  --    POTASSIUM 4.4 3.2* 4.1 4.2  --    CHLORIDE 100 94* 100 101  --    CO2 24.6 30.3* 27.3 23.1  --    ANION GAP 9.4 9.7 9.7 15.9*  --    BUN 30* 31* 23 16  --    CREATININE 1.16* 1.34* 1.11* 0.96  --    EGFR 53.7* 45.2* 56.7* 67.5  --    GLUCOSE 106* 267* 190* 192*  --    CALCIUM 9.3 9.5 9.0 9.0  --    MAGNESIUM 1.8  --  1.8  --  1.8   PHOSPHORUS 3.4 2.9 3.2  --  3.4   HEMOGLOBIN A1C  --   --   --   --  6.50*   TSH  --   --   --   --  0.913         Lab 06/11/23  0425 06/10/23  0415   ALBUMIN 3.9 4.0         Lab 06/09/23 0414 06/08/23 1708 06/08/23  1519   PROBNP 2,739.0*  --   --    HSTROP T  --  12* 13*         Lab 06/09/23 0414   CHOLESTEROL 151   LDL CHOL 80   HDL CHOL 51   TRIGLYCERIDES 113             Brief Urine Lab Results  (Last result in the past 365 days)        Color   Clarity   Blood   Leuk Est   Nitrite   Protein   CREAT   Urine HCG        06/08/23 1514 Yellow   Clear   Trace   Negative   Negative   Trace                 Microbiology Results (last 10 days)       Procedure Component Value - Date/Time    Respiratory Culture - Sputum, Cough [[id number]] Collected: 06/08/23 1711    Lab Status: Final  result Specimen: Sputum from Cough Updated: 06/10/23 1423     Respiratory Culture Light growth (2+) Normal respiratory danyell. No S. aureus or Pseudomonas aeruginosa detected. Final report.     Gram Stain Moderate (3+) Mucous strands      Moderate (3+) WBCs seen      Rare (1+) Epithelial cells per low power field      Few (2+) Gram positive cocci in pairs      Few (2+) Gram negative bacilli    Blood Culture - Blood, Arm, Right [[id number]]  (Normal) Collected: 06/08/23 1519    Lab Status: Preliminary result Specimen: Blood from Arm, Right Updated: 06/11/23 1545     Blood Culture No growth at 3 days    Blood Culture - Blood, Hand, Left [[id number]]  (Normal) Collected: 06/08/23 1519    Lab Status: Preliminary result Specimen: Blood from Hand, Left Updated: 06/11/23 1545     Blood Culture No growth at 3 days    Legionella Antigen, Urine - Urine, Urine, Clean Catch [[id number]]  (Normal) Collected: 06/08/23 1515    Lab Status: Final result Specimen: Urine, Clean Catch Updated: 06/08/23 1558     LEGIONELLA ANTIGEN, URINE Negative    MRSA Screen, PCR (Inpatient) - Swab, Nares [[id number]]  (Normal) Collected: 06/08/23 1515    Lab Status: Final result Specimen: Swab from Nares Updated: 06/08/23 1656     MRSA PCR No MRSA Detected    Narrative:      The negative predictive value of this diagnostic test is high and should only be used to consider de-escalating anti-MRSA therapy. A positive result may indicate colonization with MRSA and must be correlated clinically.    S. Pneumo Ag Urine or CSF - Urine, Urine, Clean Catch [[id number]]  (Normal) Collected: 06/08/23 1515    Lab Status: Final result Specimen: Urine, Clean Catch Updated: 06/08/23 1559     Strep Pneumo Ag Negative                         Results for orders placed during the hospital encounter of 06/08/23    Adult Transthoracic Echo Complete w/ Color, Spectral and Contrast if necessary per protocol    Interpretation Summary    Left ventricular ejection fraction appears  to be 26 - 30%.    The left ventricular cavity is moderately dilated.    Left ventricular diastolic function is consistent with (grade II w/high LAP) pseudonormalization.    Mildly reduced right ventricular systolic function noted.    The right ventricular cavity is mildly dilated.    The left atrial cavity is moderately dilated.    Left atrial volume is moderately increased.    Estimated right ventricular systolic pressure from tricuspid regurgitation is normal (<35 mmHg).      Labs Pending at Discharge:  Pending Labs       Order Current Status    Blood Culture - Blood, Arm, Right Preliminary result    Blood Culture - Blood, Hand, Left Preliminary result              Time spent on Discharge including face to face service: 32 minutes    Electronically signed by eDbi Solomon MD, 06/11/23, 4:20 PM EDT.    Portions of this documentation were transcribed electronically from a voice recognition software.  I confirm all data accurately represents the service(s) I performed at today's visit.

## 2023-06-11 NOTE — PLAN OF CARE
Goal Outcome Evaluation: Improvement report and noted. Patient anticipates discharge home soon.

## 2023-06-12 NOTE — OUTREACH NOTE
Prep Survey      Flowsheet Row Responses   [facility]? [facility]   Is LACE score < 7 ? No   Eligibility Readm Mgmt   Discharge diagnosis Acute CHF, CAP   Does the patient have one of the following disease processes/diagnoses(primary or secondary)? CHF   Does the patient have Home health ordered? No   Is there a DME ordered? No   Prep survey completed? Yes            Abigail NAVA - Registered Nurse

## 2023-06-12 NOTE — PROGRESS NOTES
Enter Query Response Below      Query Response:  Both Hypertension and Cardiomyopathy   Electronically signed by Debi Solomon MD, 23, 9:54 AM EDT.             If applicable, please update the problem list.   Patient: Cristina Barroso        : 1960  Account: [id number]           Admit Date: 2023        How to Respond to this query:       a. Click New Note     b. Answer query within the yellow box.                c. Update the Problem List, if applicable.      If you have any questions about this query contact me at:[email or web address]     Dr. Solomon,     62-year female patient with PMHx amy includes Diabetes Mellitus,  CAD status post CABG, pernament Pacemaker, Congestive Heart Failure, Hypertension, Ischemic Cardiomyopathy admitted 23 with Acute on Chronic Combined Congestive Heart Failure, Pneumonia, Type 2 NSTEMI. Treatment included Aspirin, Atorvastatin, Coreg, IV Lasix, Lisinopril, Nitroglycerin patch.     23  Echocardiogram:  Left ventricular ejection fraction appears to be 26 - 30%.     The left ventricular cavity is moderately dilated.     Left ventricular diastolic function is consistent with (grade II w/high   LAP) pseudonormalization.     Mildly reduced right ventricular systolic function noted.     The right ventricular cavity is mildly dilated.     The left atrial cavity is moderately dilated.     Left atrial volume is moderately increased.     Estimated right ventricular systolic pressure from tricuspid   regurgitation is normal (<35 mmHg).     23 : Stress Test With Myocardial Perfusion One Day     Left ventricular ejection fraction is severely reduced (Calculated EF = 22%).   Myocardial perfusion imaging indicates a large-sized infarct located in the inferior wall, lateral wall and apex with no significant ischemia noted.   Impressions are consistent with an intermediate risk study.     Please clarify the underlying etiology of the patient’s heart  failure:   >  Hypertension  > Cardiomyopathy  > Both Hypertension and Cardiomyopathy  > Other Explanation: please specify __________  > Unable to Determine    By submitting this query, we are merely seeking further clarification of documentation to accurately reflect all conditions that you are monitoring, evaluating, treating or that extend the hospitalization or utilize additional resources of care. Please utilize your independent clinical judgment when addressing the question(s) above.     This query and your response, once completed, will be entered into the legal medical record.    Sincerely,  Sis Friend RN, CCDS  Clinical Documentation Integrity Program   [email or web address]

## 2023-06-13 LAB
BACTERIA SPEC AEROBE CULT: NORMAL
BACTERIA SPEC AEROBE CULT: NORMAL

## 2023-06-15 ENCOUNTER — READMISSION MANAGEMENT (OUTPATIENT)
Dept: CALL CENTER | Facility: HOSPITAL | Age: 63
End: 2023-06-15
Payer: MEDICAID

## 2023-06-15 NOTE — OUTREACH NOTE
CHF Week 1 Survey      Flowsheet Row Responses   [facility] patient discharged from? [facility]   Does the patient have one of the following disease processes/diagnoses(primary or secondary)? CHF   CHF Week 1 attempt successful? Yes   Call start time 1654   Call end time 1658   Discharge diagnosis Acute CHF, CAP   Meds reviewed with patient/caregiver? Yes   Is the patient having any side effects they believe may be caused by any medication additions or changes? No   Does the patient have all medications ordered at discharge? Yes   Is the patient taking all medications as directed (includes completed medication regime)? Yes   Does the patient have a primary care provider?  Yes   Does the patient have an appointment with their PCP within 7 days of discharge? Yes   Has the patient kept scheduled appointments due by today? N/A   Has home health visited the patient within 72 hours of discharge? N/A   Pulse Ox monitoring None   Psychosocial issues? No   Did the patient receive a copy of their discharge instructions? Yes   Nursing interventions Reviewed instructions with patient   What is the patient's perception of their health status since discharge? Improving   Nursing interventions Nurse provided patient education   Is the patient able to teach back signs and symptoms of worsening condition? (i.e. weight gain, shortness of air, etc.) Yes   Is the patient/caregiver able to teach back the hierarchy of who to call/visit for symptoms/problems? PCP, Specialist, Home health nurse, Urgent Care, ED, 911 Yes   Additional teach back comments No fever, IV site is warm, recommended warm compress.   Is the patient able to teach back Heart Failure Zones? Yes   CHF Nursing Interventions Education provided on various zones   CHF Zone this Call Green Zone   Green Zone Patient reports doing well, No new or worsening shortness of breath, Physical activity level is normal for you, No new swelling -  feet, ankles and legs look normal for  you, Weight check stable, No chest pain   Green Zone Interventions Daily weight check, Low sodium diet, Meds as directed, Follow up visits planned    CHF Week 1 call completed? Yes   Is the patient interested in additional calls from an ambulatory ?  NOTE:  applies to high risk patients requiring additional follow-up. No   Would this patient benefit from a Referral to Amb Social Work? No   Wrap up additional comments No questions or issues at this time.   Call end time [id number]            Charlene RENE - Registered Nurse

## 2023-06-19 ENCOUNTER — TELEPHONE (OUTPATIENT)
Dept: CARDIOLOGY | Facility: CLINIC | Age: 63
End: 2023-06-19
Payer: MEDICAID

## 2023-06-19 NOTE — TELEPHONE ENCOUNTER
Left a voicemail for patient to return call to office if needing to reschedule/ cancel appointment. Reminded patient to bring all medications with them to their appointment